# Patient Record
Sex: MALE | Race: WHITE | NOT HISPANIC OR LATINO | Employment: OTHER | URBAN - METROPOLITAN AREA
[De-identification: names, ages, dates, MRNs, and addresses within clinical notes are randomized per-mention and may not be internally consistent; named-entity substitution may affect disease eponyms.]

---

## 2017-12-12 ENCOUNTER — HOSPITAL ENCOUNTER (EMERGENCY)
Facility: CLINIC | Age: 72
Discharge: HOME | End: 2017-12-12
Attending: EMERGENCY MEDICINE | Admitting: EMERGENCY MEDICINE
Payer: MEDICARE

## 2017-12-12 VITALS
OXYGEN SATURATION: 97 % | SYSTOLIC BLOOD PRESSURE: 117 MMHG | TEMPERATURE: 97.5 F | DIASTOLIC BLOOD PRESSURE: 72 MMHG | RESPIRATION RATE: 16 BRPM

## 2017-12-12 DIAGNOSIS — I47.10 PAROXYSMAL SUPRAVENTRICULAR TACHYCARDIA (H): ICD-10-CM

## 2017-12-12 PROBLEM — C4A.30: Status: ACTIVE | Noted: 2017-12-12

## 2017-12-12 PROBLEM — E80.6 DISORDER OF BILIRUBIN EXCRETION: Status: ACTIVE | Noted: 2017-12-12

## 2017-12-12 PROBLEM — K20.90 ESOPHAGITIS: Status: ACTIVE | Noted: 2017-12-12

## 2017-12-12 PROCEDURE — 93005 ELECTROCARDIOGRAM TRACING: CPT | Mod: 76

## 2017-12-12 PROCEDURE — 25000128 H RX IP 250 OP 636

## 2017-12-12 PROCEDURE — 99291 CRITICAL CARE FIRST HOUR: CPT | Mod: 25 | Performed by: EMERGENCY MEDICINE

## 2017-12-12 PROCEDURE — 93010 ELECTROCARDIOGRAM REPORT: CPT | Mod: 76 | Performed by: EMERGENCY MEDICINE

## 2017-12-12 PROCEDURE — 96376 TX/PRO/DX INJ SAME DRUG ADON: CPT

## 2017-12-12 PROCEDURE — 96374 THER/PROPH/DIAG INJ IV PUSH: CPT

## 2017-12-12 PROCEDURE — 25000128 H RX IP 250 OP 636: Performed by: EMERGENCY MEDICINE

## 2017-12-12 PROCEDURE — 99285 EMERGENCY DEPT VISIT HI MDM: CPT | Mod: 25

## 2017-12-12 PROCEDURE — 93005 ELECTROCARDIOGRAM TRACING: CPT

## 2017-12-12 PROCEDURE — 93010 ELECTROCARDIOGRAM REPORT: CPT | Mod: Z6 | Performed by: EMERGENCY MEDICINE

## 2017-12-12 RX ORDER — ADENOSINE 3 MG/ML
6 INJECTION, SOLUTION INTRAVENOUS ONCE
Status: COMPLETED | OUTPATIENT
Start: 2017-12-12 | End: 2017-12-12

## 2017-12-12 RX ORDER — ADENOSINE 3 MG/ML
12 INJECTION, SOLUTION INTRAVENOUS ONCE
Status: COMPLETED | OUTPATIENT
Start: 2017-12-12 | End: 2017-12-12

## 2017-12-12 RX ORDER — ADENOSINE 3 MG/ML
INJECTION, SOLUTION INTRAVENOUS
Status: COMPLETED
Start: 2017-12-12 | End: 2017-12-12

## 2017-12-12 RX ADMIN — ADENOSINE 12 MG: 3 INJECTION, SOLUTION INTRAVENOUS at 03:20

## 2017-12-12 RX ADMIN — ADENOSINE 12 MG: 3 INJECTION, SOLUTION INTRAVENOUS at 03:15

## 2017-12-12 RX ADMIN — ADENOSINE 6 MG: 3 INJECTION, SOLUTION INTRAVENOUS at 03:13

## 2017-12-12 ASSESSMENT — ENCOUNTER SYMPTOMS
COUGH: 0
CHILLS: 0
APPETITE CHANGE: 0
HEADACHES: 0
FATIGUE: 0
WEAKNESS: 0
CHEST TIGHTNESS: 0
LIGHT-HEADEDNESS: 1
SHORTNESS OF BREATH: 0
PALPITATIONS: 1
VOMITING: 0
NAUSEA: 1
ABDOMINAL PAIN: 0
BACK PAIN: 0

## 2017-12-12 NOTE — ED PROVIDER NOTES
History     Chief Complaint   Patient presents with     Tachycardia     HPI  Kd Hernandez is a 72 year old male with history of PSVT who presents for evaluation of recurrent SVT tonight.  Patient states that he woke to use the bathroom around 2 AM and felt palpitations.  He reports associated mild chest discomfort and slight lightheadedness.  Denies fever chills.  Denies diaphoresis or nausea.  Symptoms similar to previous episodes of SVT.  Patient attempted vagal maneuvers at home without resolution of his symptoms.  Of note, patient did take a dose of Robitussin-DM tonight which may be the precipitating factor of his arrhythmia today.    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    Past Surgical History:    Past Surgical History:   Procedure Laterality Date     HERNIORRHAPHY UMBILICAL  12/6/2011    Procedure:HERNIORRHAPHY UMBILICAL; Umbilical Hernia Repair with Mesh; Surgeon:JESUS ALBERTO RODRÍGUEZ; Location:[facility]       Family History:    No family history on file.    Social History:  Marital Status:   [2]  Social History   Substance Use Topics     Smoking status: Not on file     Smokeless tobacco: Not on file     Alcohol use Not on file        Medications:      aspirin 81 MG tablet   atenolol (TENORMIN) 25 MG tablet   lorazepam (ATIVAN) 0.5 MG tablet   MetFORmin (GLUCOPHAGE) 500 MG tablet   alum hydroxide-mag carbonate (GAVISCON)   MG/15ML SUSP   simvastatin (ZOCOR) 10 MG tablet   tamsulosin (FLOMAX) 0.4 MG 24 hr capsule         Review of Systems   Constitutional: Negative for appetite change, chills and fatigue.   HENT: Negative for congestion.    Respiratory: Negative for cough, chest tightness and shortness of breath.    Cardiovascular: Positive for chest pain and palpitations. Negative for leg swelling.   Gastrointestinal: Positive for nausea. Negative for abdominal pain and vomiting.   Musculoskeletal: Negative for back pain.   Skin: Negative for rash.   Neurological: Positive for light-headedness. Negative for syncope, weakness and headaches.   All other systems reviewed and are negative.      Physical Exam   BP: 112/76  Heart Rate: 156  Temp: 97.5  F (36.4  C)  Resp: 18  SpO2: 100 %      Physical Exam   Constitutional: He is oriented to person, place, and time. He appears well-developed and well-nourished. No distress.   HENT:   Head: Normocephalic and atraumatic.   Eyes: Conjunctivae are normal.   Cardiovascular: Regular rhythm, normal heart sounds and intact distal pulses.  Tachycardia present.    No murmur heard.  Pulmonary/Chest: Effort normal and breath sounds normal. He has no rales.   Abdominal: Soft. There is no tenderness.   Neurological: He is alert and oriented to person, place, and time.   Skin: Skin is warm and dry. He is not diaphoretic.   Psychiatric: He has a normal mood and affect.   Nursing note and vitals reviewed.      ED Course     ED Course     Procedures               EKG Interpretation:      Interpreted by Matt Hung  Time reviewed: 0303  Symptoms at time of EKG: palpitations   Rhythm: paroxismal supraventricular tachycardia  Rate: 150-160  Axis: Normal  Ectopy: none  Conduction: normal  ST Segments/ T Waves: ST Segment Depression Global  Q Waves: none  Comparison to prior: Similar to previous SVT    Clinical Impression: supraventricular tachycardia with diffuse ST depression           EKG  Interpretation:      Interpreted by Matt Hung  Time reviewed:0325   Symptoms at time of EKG: none   Rhythm: Normal sinus   Rate: Normal  Axis: Normal  Ectopy: None  Conduction: Normal  ST Segments/ T Waves: Subtle residual lateral ST depression  Q Waves: None  Comparison to prior: Resolution of previous PSVT    Clinical Impression: Sinus rhythm with subtle ST depressions improved from previous              Critical Care Addendum    My initial assessment, based on my review of cardiac rhythm monitor and 12 lead ECG analysis, established that Kd Hernandez has a critical arrhythmia, which requires immediate intervention, and therefore He is critically ill.     After the initial assessment, the care team initiated medication therapy with adenosine to provide stabilization care. Due to the critical nature of this patient, I reassessed nursing observations, vital signs, physical exam, review of cardiac rhythm monitor and 12 lead ECG analysis multiple times prior to He disposition.     Time also spent performing documentation, discussion with family to obtain medical information for decision making and coordination of care.     Critical care time (excluding teaching time and procedures): 35 minutes.              Labs Ordered and Resulted from Time of ED Arrival Up to the Time of Departure from the ED - No data to display    4:24 AM: PT re-assessed. Feeling normal. No recurrent palpitations.  Patient remains in sinus rhythm in the 60s.  Plan to ambulate patient in the emergency department and if he continues to feel well, discharged with outpatient cardiology follow-up      Assessments & Plan (with Medical Decision Making)  72-year-old male with history of paroxysmal supraventricular tachycardia presented for evaluation of palpitations similar to his previous episodes of SVT.  On arrival, initial cardiac monitor shows SVT.  Temp today for maneuvers without success.  IV line established and patient given  adenosine.  3 doses required 6 mg, 12 mg, and a second 12 mg dose before conversion to sinus rhythm.  Patient had resolution of his symptoms.  Patient monitored in the emergency department for roughly an hour with no recurrent symptoms.  Patient remained asymptomatic and well appearing.  Patient seems stable at this time so was subsequently discharged home with a plan to follow-up with cardiology as an outpatient.     I have reviewed the nursing notes.    I have reviewed the findings, diagnosis, plan and need for follow up with the patient.       Discharge Medication List as of 12/12/2017  4:36 AM          Final diagnoses:   Paroxysmal supraventricular tachycardia (H)       12/12/2017   [facility] EMERGENCY DEPARTMENT     Hung, Matt Hines MD  12/14/17 0131

## 2017-12-12 NOTE — ED NOTES
See mere for meds given.     3 rounds of adenosine given IVP fast. successful on the 3rd attempt.     HR now NSR at a rate of 65 and PT states he is feeling much better.

## 2017-12-12 NOTE — DISCHARGE INSTRUCTIONS
Understanding Supraventricular Tachycardia  Supraventricular tachycardia (SVT) is a type of abnormal heart rhythm that results in fast heartbeats. The heart normally beats 60 to 100 beats per minute while you are at rest and awake. With SVT, the heart beats more than 100 times a minute. It may even beat over 200 times a minute. This is caused by a problem in the electrical system of the heart. It can lessen the amount of blood pumped through the heart.  How the heart beats  A heartbeat is the rhythm of the heart as it contracts to squeeze blood through the body. It s caused by electrical signals in the heart. A beat starts when a special group of cells give off an electrical signal. These cells are in the sinoatrial (SA) node. The SA node is in the upper right chamber of your heart (atrium). The signal from the SA node travels down to the 2 lower chambers of your heart (ventricles). On the way, the signal goes through the atrioventricular (AV) node. This is a special group of cells between the atria and ventricles. From there, the signal travels to your left and right ventricles. As it travels, the signal tells nearby parts of your heart to contract. This causes your heart to pump in a coordinated way.  What is SVT?  When you have SVT, the signal to start your heartbeat doesn t come from the SA node. Instead it comes from another part of the left or right atrium. Or it comes from the AV node. Some area outside the SA node begins to fire quickly, causing a rapid heartbeat of over 100 beats per minute or the electrical signals are caught up in an abnormal looping circuit. This shortens the time your ventricles have to fill. If your heartbeat is fast enough, your heart may be unable to pump enough blood forward to the rest of your body. The abnormal heartbeat may last for a few seconds to a few hours before your heart returns to its normal rhythm. Some SVT rhythms can last for days or weeks, or even become  permanent.  Types of SVT  There are several types of SVT. They include:    Atrial fibrillation. This is most common type of SVT. The upper chambers of the heart quiver very fast instead of pumping due to disorganized electrical activity in the atria.    Atrial flutter. This type of SVT is a milder form of fibrillation. The upper chambers of the heart flutter instead of pumping normally.    Atrioventricular gregoria reentrant tachycardia. It occurs when you have two channels through the AV node, instead of just one. The signal goes down one channel and up the other.    Atrioventricular reciprocating tachycardia. With this condition, there is an extra connection of muscle between the atrium and the ventricle. This is known as an accessory pathway and can conduct electricity upwards and downwards. The signal goes down the AV node and back to the atrium through the accessory pathway. It then goes down the AV node again. In rare cases, this condition leads to an abnormal heart rhythm that causes sudden death. This is a congenital defect, which means you were born with it.    Atrial tachycardia. This is another common type of SVT. A small group of cells in the atria begin to fire abnormally and trigger the fast heartbeat.    Multifocal atrial tachycardia. Multiple groups of cells in your atria fire abnormally and trigger a fast heartbeat.  What causes SVT?  Some types of SVT run in families. Some people have heart problems from birth that cause SVT. High blood pressure, heart failure, mitral valve disease, sleep apnea, thyroid problems, and heart attacks can cause SVT. Smoking, excess caffeine or alcohol, and some medicines can increase your risk of having SVT.  Symptoms of SVT  When SVT happens, you may feel no symptoms. Or you may have:    Fluttering feelings in your chest (palpitations)    A tight feeling or pain in your chest    A pulsing feeling in your neck    Dizziness    Shortness of  breath    Tiredness    Fainting    Nausea  In very rare cases, SVT can cause sudden death.  Diagnosing SVT  Your primary healthcare provider may diagnose you. Or you may see a heart doctor (cardiologist). The doctor will ask about your health history. He or she will also give you a physical exam. You may also have tests. These help show what kind of SVT you have, and what may cause it. They also help check for other problems. The tests may include:    Electrocardiogram (ECG), to analyze the abnormal rhythm    Continuous heart monitors such as a holter monitor or an event recorder to watch your heart rhythm over a longer period in an attempt to catch the SVT rhythm    Blood tests, to look for various causes such as thyroid problems or electrolyte abnormalities    Chest X-ray, to check for lung problems and look at the size of your heart    Exercise stress test, to see how well your heart works under stress    Echocardiography, to check your heart structure and function    Electrophysiologic study (EPS), an invasive procedure using wires in the heart to check the heart's electrical signals and diagnose the SVT  Date Last Reviewed: 5/1/2016 2000-2017 [employer]. [address] [place] [address]. All rights reserved. This information is not intended as a substitute for professional medical care. Always follow your healthcare professional's instructions.

## 2017-12-12 NOTE — ED NOTES
PT continues resting in position of comfort. All needs are being met and all comfort measures are being addressed. Awaiting MD dispo at this time. I will continue to assess and monitor PT.

## 2017-12-12 NOTE — ED AVS SNAPSHOT
[facility] Emergency Department    [address] [facility] [address]    Phone:  [phone]    Fax:  [phone]                                       Kd Hernandez   MRN: [id number]    Department:  [facility] Emergency Department   Date of Visit:  12/12/2017           After Visit Summary Signature Page     I have received my discharge instructions, and my questions have been answered. I have discussed any challenges I see with this plan with the nurse or doctor.    ..........................................................................................................................................  Patient/Patient Representative Signature      ..........................................................................................................................................  Patient Representative Print Name and Relationship to Patient    ..................................................               ................................................  Date                                            Time    ..........................................................................................................................................  Reviewed by Signature/Title    ...................................................              ..............................................  Date                                                            Time

## 2017-12-12 NOTE — ED NOTES
PT OOB and ambulating department with steady gait and no assist. Denies SOB, nausea, dizziness or feeling like his heart is racing. Placed back on josiah and updated MD.

## 2017-12-12 NOTE — ED AVS SNAPSHOT
[facility] Emergency Department    [address] [facility] [address]    Phone:  [phone]    Fax:  [phone]                                       Kd Hernandez   MRN: [id number]    Department:  [facility] Emergency Department   Date of Visit:  12/12/2017           Patient Information     Date Of Birth          1945        Your diagnoses for this visit were:     Paroxysmal supraventricular tachycardia (H)        You were seen by Matt Hung MD.      Follow-up Information     Schedule an appointment as soon as possible for a visit with Matt Zamudio MD.    Specialty:  Family Practice    Contact information:    [facility]  [address] [facility] [id number] [phone]          Schedule an appointment as soon as possible for a visit with [facility].    Specialty:  Cardiology    Why:  For follow up of your abnormal heart rhythm    Contact information:    [address] [place] [address] [phone]    Additional information:    The medical center is located at   [address] [place] [address] ([facility] [address]   [facility] [place], [place] miles north   of [place]).        Discharge Instructions         Understanding Supraventricular Tachycardia  Supraventricular tachycardia (SVT) is a type of abnormal heart rhythm that results in fast heartbeats. The heart normally beats 60 to 100 beats per minute while you are at rest and awake. With SVT, the heart beats more than 100 times a minute. It may even beat over 200 times a minute. This is caused by a problem in the electrical system of the heart. It can lessen the amount of blood pumped through the heart.  How the heart beats  A heartbeat is the rhythm of the heart as it contracts to squeeze blood through the body. It s caused by electrical signals in the heart. A beat starts when a special group of cells give off an electrical signal. These cells are in the sinoatrial  (SA) node. The SA node is in the upper right chamber of your heart (atrium). The signal from the SA node travels down to the 2 lower chambers of your heart (ventricles). On the way, the signal goes through the atrioventricular (AV) node. This is a special group of cells between the atria and ventricles. From there, the signal travels to your left and right ventricles. As it travels, the signal tells nearby parts of your heart to contract. This causes your heart to pump in a coordinated way.  What is SVT?  When you have SVT, the signal to start your heartbeat doesn t come from the SA node. Instead it comes from another part of the left or right atrium. Or it comes from the AV node. Some area outside the SA node begins to fire quickly, causing a rapid heartbeat of over 100 beats per minute or the electrical signals are caught up in an abnormal looping circuit. This shortens the time your ventricles have to fill. If your heartbeat is fast enough, your heart may be unable to pump enough blood forward to the rest of your body. The abnormal heartbeat may last for a few seconds to a few hours before your heart returns to its normal rhythm. Some SVT rhythms can last for days or weeks, or even become permanent.  Types of SVT  There are several types of SVT. They include:    Atrial fibrillation. This is most common type of SVT. The upper chambers of the heart quiver very fast instead of pumping due to disorganized electrical activity in the atria.    Atrial flutter. This type of SVT is a milder form of fibrillation. The upper chambers of the heart flutter instead of pumping normally.    Atrioventricular gregoria reentrant tachycardia. It occurs when you have two channels through the AV node, instead of just one. The signal goes down one channel and up the other.    Atrioventricular reciprocating tachycardia. With this condition, there is an extra connection of muscle between the atrium and the ventricle. This is known as an  accessory pathway and can conduct electricity upwards and downwards. The signal goes down the AV node and back to the atrium through the accessory pathway. It then goes down the AV node again. In rare cases, this condition leads to an abnormal heart rhythm that causes sudden death. This is a congenital defect, which means you were born with it.    Atrial tachycardia. This is another common type of SVT. A small group of cells in the atria begin to fire abnormally and trigger the fast heartbeat.    Multifocal atrial tachycardia. Multiple groups of cells in your atria fire abnormally and trigger a fast heartbeat.  What causes SVT?  Some types of SVT run in families. Some people have heart problems from birth that cause SVT. High blood pressure, heart failure, mitral valve disease, sleep apnea, thyroid problems, and heart attacks can cause SVT. Smoking, excess caffeine or alcohol, and some medicines can increase your risk of having SVT.  Symptoms of SVT  When SVT happens, you may feel no symptoms. Or you may have:    Fluttering feelings in your chest (palpitations)    A tight feeling or pain in your chest    A pulsing feeling in your neck    Dizziness    Shortness of breath    Tiredness    Fainting    Nausea  In very rare cases, SVT can cause sudden death.  Diagnosing SVT  Your primary healthcare provider may diagnose you. Or you may see a heart doctor (cardiologist). The doctor will ask about your health history. He or she will also give you a physical exam. You may also have tests. These help show what kind of SVT you have, and what may cause it. They also help check for other problems. The tests may include:    Electrocardiogram (ECG), to analyze the abnormal rhythm    Continuous heart monitors such as a holter monitor or an event recorder to watch your heart rhythm over a longer period in an attempt to catch the SVT rhythm    Blood tests, to look for various causes such as thyroid problems or electrolyte  abnormalities    Chest X-ray, to check for lung problems and look at the size of your heart    Exercise stress test, to see how well your heart works under stress    Echocardiography, to check your heart structure and function    Electrophysiologic study (EPS), an invasive procedure using wires in the heart to check the heart's electrical signals and diagnose the SVT  Date Last Reviewed: 5/1/2016 2000-2017 [employer]. [address] [place] [address], [place] [address]. All rights reserved. This information is not intended as a substitute for professional medical care. Always follow your healthcare professional's instructions.          24 Hour Appointment Hotline       To make an appointment at any [facility], call [phone] ([phone]). If you don't have a family doctor or clinic, we will help you find one. [facility] clinics are conveniently located to serve the needs of you and your family.             Review of your medicines      Our records show that you are taking the medicines listed below. If these are incorrect, please call your family doctor or clinic.        Dose / Directions Last dose taken    aspirin 81 MG tablet   Dose:  1 tablet        Take 1 tablet by mouth daily.   Refills:  0        atenolol 25 MG tablet   Commonly known as:  TENORMIN        Take  by mouth daily. Pt takes 12.5mg daily (he cuts pill in half)   Refills:  0        FLOMAX 0.4 MG capsule   Dose:  0.4 mg   Generic drug:  tamsulosin        Take 0.4 mg by mouth daily.   Refills:  0        GAVISCON  MG/15ML Susp suspension   Dose:  30 mL   Generic drug:  alum hydroxide-mag carbonate        Take 30 mLs by mouth 2 times daily as needed.   Refills:  0        LORazepam 0.5 MG tablet   Commonly known as:  ATIVAN   Dose:  0.5 mg        Take 0.5 mg by mouth every 6 hours as needed.   Refills:  0        metFORMIN 500 MG tablet   Commonly known as:  GLUCOPHAGE   Dose:  500 mg        Take 500 mg by mouth 2 times daily  "(with meals).   Refills:  0        simvastatin 10 MG tablet   Commonly known as:  ZOCOR   Dose:  10 mg        Take 10 mg by mouth At Bedtime.   Refills:  0                Procedures and tests performed during your visit     Procedure/Test Number of Times Performed    EKG 12 lead 2      Orders Needing Specimen Collection     None      Pending Results     No orders found from 12/10/2017 to 2017.            Pending Culture Results     No orders found from 12/10/2017 to 2017.            Pending Results Instructions     If you had any lab results that were not finalized at the time of your Discharge, you can call the ED Lab Result RN at [phone]. You will be contacted by this team for any positive Lab results or changes in treatment. The nurses are available 7 days a week from 10A to 6:30P.  You can leave a message 24 hours per day and they will return your call.        Test Results From Your Hospital Stay               Thank you for choosing [facility]       Thank you for choosing [facility] for your care. Our goal is always to provide you with excellent care. Hearing back from our patients is one way we can continue to improve our services. Please take a few minutes to complete the written survey that you may receive in the mail after you visit with us. Thank you!        [employer] Information     [facility] lets you send messages to your doctor, view your test results, renew your prescriptions, schedule appointments and more. To sign up, go to [email or web address] . Click on \"Log in\" on the left side of the screen, which will take you to the Welcome page. Then click on \"Sign up Now\" on the right side of the page.     You will be asked to enter the access code listed below, as well as some personal information. Please follow the directions to create your username and password.     Your access code is: [email or web address]  Expires: 3/12/2018  4:13 AM     Your access code will  in 90 days. If you need help or a " new code, please call your [facility] clinic or [phone].        Care EveryWhere ID     This is your Care EveryWhere ID. This could be used by other organizations to access your [facility] medical records  [id number]        Equal Access to Services     ANGELICA WADDELL : Audra wong Soaidee, waaxda luqadaha, qaybta kaalmada mac, juany kline. So [facility] [phone].    ATENCIÓN: Si habla español, tiene a kuo disposición servicios gratuitos de asistencia lingüística. Llame al [phone].    We comply with applicable federal civil rights laws and [place] laws. We do not discriminate on the basis of race, color, national origin, age, disability, sex, sexual orientation, or gender identity.            After Visit Summary       This is your record. Keep this with you and show to your community pharmacist(s) and doctor(s) at your next visit.

## 2017-12-12 NOTE — ED NOTES
PT presents to the ED with C/O fast heart rate.   Brought to room 3 VIA wheelchair, placed lilian gown, omn the gurney and on  The cardiac monitor. PT is noted to be in SVT at a rate of 160s. PT states he has mild SOB and denies pain anywhere. PT is A&OX4, appears to be in mild discomfort. All needs are being assessed and will be met and all comfort measures are being addressed. Awaiting MD gill and orders at this time.

## 2018-05-22 ENCOUNTER — HOSPITAL ENCOUNTER (EMERGENCY)
Facility: CLINIC | Age: 73
Discharge: HOME | End: 2018-05-22
Attending: EMERGENCY MEDICINE | Admitting: EMERGENCY MEDICINE
Payer: MEDICARE

## 2018-05-22 VITALS
DIASTOLIC BLOOD PRESSURE: 104 MMHG | BODY MASS INDEX: 24.91 KG/M2 | OXYGEN SATURATION: 97 % | TEMPERATURE: 97.9 F | HEIGHT: 70 IN | SYSTOLIC BLOOD PRESSURE: 141 MMHG | RESPIRATION RATE: 18 BRPM | WEIGHT: 174 LBS

## 2018-05-22 DIAGNOSIS — I47.10 PAROXYSMAL SUPRAVENTRICULAR TACHYCARDIA (H): ICD-10-CM

## 2018-05-22 LAB
ANION GAP SERPL CALCULATED.3IONS-SCNC: 6 MMOL/L (ref 3–14)
APTT PPP: 28 SEC (ref 22–37)
BASOPHILS # BLD AUTO: 0 10E9/L (ref 0–0.2)
BASOPHILS NFR BLD AUTO: 0.4 %
BUN SERPL-MCNC: 16 MG/DL (ref 7–30)
CALCIUM SERPL-MCNC: 8.6 MG/DL (ref 8.5–10.1)
CHLORIDE SERPL-SCNC: 108 MMOL/L (ref 94–109)
CO2 SERPL-SCNC: 28 MMOL/L (ref 20–32)
CREAT SERPL-MCNC: 1.01 MG/DL (ref 0.66–1.25)
DIFFERENTIAL METHOD BLD: NORMAL
EOSINOPHIL # BLD AUTO: 0.2 10E9/L (ref 0–0.7)
EOSINOPHIL NFR BLD AUTO: 2.7 %
ERYTHROCYTE [DISTWIDTH] IN BLOOD BY AUTOMATED COUNT: 13.3 % (ref 10–15)
GFR SERPL CREATININE-BSD FRML MDRD: 72 ML/MIN/1.7M2
GLUCOSE SERPL-MCNC: 219 MG/DL (ref 70–99)
HCT VFR BLD AUTO: 45.4 % (ref 40–53)
HGB BLD-MCNC: 15.6 G/DL (ref 13.3–17.7)
IMM GRANULOCYTES # BLD: 0 10E9/L (ref 0–0.4)
IMM GRANULOCYTES NFR BLD: 0.4 %
INR PPP: 1.09 (ref 0.86–1.14)
LYMPHOCYTES # BLD AUTO: 1.1 10E9/L (ref 0.8–5.3)
LYMPHOCYTES NFR BLD AUTO: 16.2 %
MCH RBC QN AUTO: 30.9 PG (ref 26.5–33)
MCHC RBC AUTO-ENTMCNC: 34.4 G/DL (ref 31.5–36.5)
MCV RBC AUTO: 90 FL (ref 78–100)
MONOCYTES # BLD AUTO: 0.6 10E9/L (ref 0–1.3)
MONOCYTES NFR BLD AUTO: 7.9 %
NEUTROPHILS # BLD AUTO: 5.1 10E9/L (ref 1.6–8.3)
NEUTROPHILS NFR BLD AUTO: 72.4 %
PLATELET # BLD AUTO: 170 10E9/L (ref 150–450)
POTASSIUM SERPL-SCNC: 4.1 MMOL/L (ref 3.4–5.3)
RBC # BLD AUTO: 5.05 10E12/L (ref 4.4–5.9)
SODIUM SERPL-SCNC: 142 MMOL/L (ref 133–144)
TROPONIN I SERPL-MCNC: <0.015 UG/L (ref 0–0.04)
WBC # BLD AUTO: 7.1 10E9/L (ref 4–11)

## 2018-05-22 PROCEDURE — 84484 ASSAY OF TROPONIN QUANT: CPT | Performed by: EMERGENCY MEDICINE

## 2018-05-22 PROCEDURE — 80048 BASIC METABOLIC PNL TOTAL CA: CPT | Performed by: EMERGENCY MEDICINE

## 2018-05-22 PROCEDURE — 25000128 H RX IP 250 OP 636: Performed by: EMERGENCY MEDICINE

## 2018-05-22 PROCEDURE — 85730 THROMBOPLASTIN TIME PARTIAL: CPT | Performed by: EMERGENCY MEDICINE

## 2018-05-22 PROCEDURE — 85610 PROTHROMBIN TIME: CPT | Performed by: EMERGENCY MEDICINE

## 2018-05-22 PROCEDURE — 96361 HYDRATE IV INFUSION ADD-ON: CPT | Performed by: EMERGENCY MEDICINE

## 2018-05-22 PROCEDURE — 99284 EMERGENCY DEPT VISIT MOD MDM: CPT | Mod: 25 | Performed by: EMERGENCY MEDICINE

## 2018-05-22 PROCEDURE — 25000128 H RX IP 250 OP 636

## 2018-05-22 PROCEDURE — 93005 ELECTROCARDIOGRAM TRACING: CPT | Performed by: EMERGENCY MEDICINE

## 2018-05-22 PROCEDURE — 99291 CRITICAL CARE FIRST HOUR: CPT | Mod: 25 | Performed by: EMERGENCY MEDICINE

## 2018-05-22 PROCEDURE — 93010 ELECTROCARDIOGRAM REPORT: CPT | Mod: 76 | Performed by: EMERGENCY MEDICINE

## 2018-05-22 PROCEDURE — 96374 THER/PROPH/DIAG INJ IV PUSH: CPT | Performed by: EMERGENCY MEDICINE

## 2018-05-22 PROCEDURE — 93005 ELECTROCARDIOGRAM TRACING: CPT | Mod: 76 | Performed by: EMERGENCY MEDICINE

## 2018-05-22 PROCEDURE — 93010 ELECTROCARDIOGRAM REPORT: CPT | Mod: Z6 | Performed by: EMERGENCY MEDICINE

## 2018-05-22 PROCEDURE — 85025 COMPLETE CBC W/AUTO DIFF WBC: CPT | Performed by: EMERGENCY MEDICINE

## 2018-05-22 RX ORDER — ADENOSINE 3 MG/ML
6 INJECTION, SOLUTION INTRAVENOUS ONCE
Status: COMPLETED | OUTPATIENT
Start: 2018-05-22 | End: 2018-05-22

## 2018-05-22 RX ORDER — ADENOSINE 3 MG/ML
INJECTION, SOLUTION INTRAVENOUS
Status: COMPLETED
Start: 2018-05-22 | End: 2018-05-22

## 2018-05-22 RX ORDER — ADENOSINE 3 MG/ML
12 INJECTION, SOLUTION INTRAVENOUS ONCE
Status: COMPLETED | OUTPATIENT
Start: 2018-05-22 | End: 2018-05-22

## 2018-05-22 RX ADMIN — SODIUM CHLORIDE 500 ML: 9 INJECTION, SOLUTION INTRAVENOUS at 09:00

## 2018-05-22 RX ADMIN — ADENOSINE 6 MG: 3 INJECTION, SOLUTION INTRAVENOUS at 09:05

## 2018-05-22 RX ADMIN — ADENOSINE 12 MG: 3 INJECTION, SOLUTION INTRAVENOUS at 09:11

## 2018-05-22 NOTE — ED NOTES
Pads placed on patient with defibrillator at bedside. Pt on monitor. MD at bedside, adenosine 6 mg given IV. No response from medication.

## 2018-05-22 NOTE — ED AVS SNAPSHOT
[facility] Emergency Department    [address] [facility] [address]    Phone:  [phone]    Fax:  [phone]                                       Kd Hernandez   MRN: [id number]    Department:  [facility] Emergency Department   Date of Visit:  5/22/2018           After Visit Summary Signature Page     I have received my discharge instructions, and my questions have been answered. I have discussed any challenges I see with this plan with the nurse or doctor.    ..........................................................................................................................................  Patient/Patient Representative Signature      ..........................................................................................................................................  Patient Representative Print Name and Relationship to Patient    ..................................................               ................................................  Date                                            Time    ..........................................................................................................................................  Reviewed by Signature/Title    ...................................................              ..............................................  Date                                                            Time

## 2018-05-22 NOTE — DISCHARGE INSTRUCTIONS
Return if symptoms worsen or new symptoms develop.  Follow-up with primary care physician next available.  Drink plenty of fluids.  Follow-up with cardiology as soon as possible to discuss your SVT.  If increased heart rate any chest pain shortness of breath or other symptoms present please return for recheck  Treatment for Supraventricular Tachycardia  Supraventricular tachycardia (SVT) is a type of abnormally fast heartbeat. The heart normally beats 60 to 100 beats per minute. With SVT, the heart beats more than 100 times a minute. It may beat as fast as 250 times a minute. This is caused by a problem in the electrical system of the heart. It can lessen the amount of blood pumped through the heart.  Types of treatment  You may not need treatment for SVT if you have only had one episode. If you do need treatment, there are several kinds. They include:    Valsalva maneuver. This is a way to increase pressure in the abdomen and chest. It can correct your heart rhythm right away. To do it, you bear down with your stomach muscles, as though you are trying to have a bowel movement.    Carotid massage. Your healthcare provider may gently rub the carotid artery in your neck. This can also help correct the heart rhythm right away.    Medicine. There are various kinds you can take. Calcium channel blockers or adenosine can help correct heart rhythm right away. If you have SVT only 1 or 2 times a year, you may take beta-blockers or calcium channel medicine as needed. If your SVT is more frequent, you may need to take medicine every day. Some people may need to take several medicines to prevent episodes of SVT.    Electrocardioversion. This is a shock to the heart to restart a normal rhythm right away. This may be done if you have a severe episode of SVT.    Catheter ablation. This can help permanently stop SVT. Your healthcare provider puts a thin, flexible tube (catheter) into a blood vessel in the groin. He or she then  gently pushes it up into your heart. The area of your heart that causes your SVT is then burned. This prevents that area from starting a signal that causes SVT.   Lifestyle changes to help prevent SVT episodes  Your healthcare provider might suggest other ways to help prevent SVT, such as:    Having less alcohol and caffeine    Not smoking    Lowering your stress    Eating foods that are healthy for your heart  When to call your healthcare provider  Call your healthcare provider if you have any of the following:    Sudden shortness of breath (call 911)    Severe palpitations    Severe dizziness    Severe chest pain    Symptoms that are happening more often   Date Last Reviewed: 8/10/2015    0498-9263 [employer]. [address] [place] [address]. All rights reserved. This information is not intended as a substitute for professional medical care. Always follow your healthcare professional's instructions.          Understanding Supraventricular Tachycardia  Supraventricular tachycardia (SVT) is a type of abnormal heart rhythm that results in fast heartbeats. The heart normally beats 60 to 100 beats per minute while you are at rest and awake. With SVT, the heart beats more than 100 times a minute. It may even beat over 200 times a minute. This is caused by a problem in the electrical system of the heart. It can lessen the amount of blood pumped through the heart.  How the heart beats  A heartbeat is the rhythm of the heart as it contracts to squeeze blood through the body. It s caused by electrical signals in the heart. A beat starts when a special group of cells give off an electrical signal. These cells are in the sinoatrial (SA) node. The SA node is in the upper right chamber of your heart (atrium). The signal from the SA node travels down to the 2 lower chambers of your heart (ventricles). On the way, the signal goes through the atrioventricular (AV) node. This is a special group of cells  between the atria and ventricles. From there, the signal travels to your left and right ventricles. As it travels, the signal tells nearby parts of your heart to contract. This causes your heart to pump in a coordinated way.  What is SVT?  When you have SVT, the signal to start your heartbeat doesn t come from the SA node. Instead it comes from another part of the left or right atrium. Or it comes from the AV node. Some area outside the SA node begins to fire quickly, causing a rapid heartbeat of over 100 beats per minute or the electrical signals are caught up in an abnormal looping circuit. This shortens the time your ventricles have to fill. If your heartbeat is fast enough, your heart may be unable to pump enough blood forward to the rest of your body. The abnormal heartbeat may last for a few seconds to a few hours before your heart returns to its normal rhythm. Some SVT rhythms can last for days or weeks, or even become permanent.  Types of SVT  There are several types of SVT. They include:    Atrial fibrillation. This is most common type of SVT. The upper chambers of the heart quiver very fast instead of pumping due to disorganized electrical activity in the atria.    Atrial flutter. This type of SVT is a milder form of fibrillation. The upper chambers of the heart flutter instead of pumping normally.    Atrioventricular gregoria reentrant tachycardia. It occurs when you have two channels through the AV node, instead of just one. The signal goes down one channel and up the other.    Atrioventricular reciprocating tachycardia. With this condition, there is an extra connection of muscle between the atrium and the ventricle. This is known as an accessory pathway and can conduct electricity upwards and downwards. The signal goes down the AV node and back to the atrium through the accessory pathway. It then goes down the AV node again. In rare cases, this condition leads to an abnormal heart rhythm that causes sudden  death. This is a congenital defect, which means you were born with it.    Atrial tachycardia. This is another common type of SVT. A small group of cells in the atria begin to fire abnormally and trigger the fast heartbeat.    Multifocal atrial tachycardia. Multiple groups of cells in your atria fire abnormally and trigger a fast heartbeat.  What causes SVT?  Some types of SVT run in families. Some people have heart problems from birth that cause SVT. High blood pressure, heart failure, mitral valve disease, sleep apnea, thyroid problems, and heart attacks can cause SVT. Smoking, excess caffeine or alcohol, and some medicines can increase your risk of having SVT.  Symptoms of SVT  When SVT happens, you may feel no symptoms. Or you may have:    Fluttering feelings in your chest (palpitations)    A tight feeling or pain in your chest    A pulsing feeling in your neck    Dizziness    Shortness of breath    Tiredness    Fainting    Nausea  In very rare cases, SVT can cause sudden death.  Diagnosing SVT  Your primary healthcare provider may diagnose you. Or you may see a heart doctor (cardiologist). The doctor will ask about your health history. He or she will also give you a physical exam. You may also have tests. These help show what kind of SVT you have, and what may cause it. They also help check for other problems. The tests may include:    Electrocardiogram (ECG), to analyze the abnormal rhythm    Continuous heart monitors such as a holter monitor or an event recorder to watch your heart rhythm over a longer period in an attempt to catch the SVT rhythm    Blood tests, to look for various causes such as thyroid problems or electrolyte abnormalities    Chest X-ray, to check for lung problems and look at the size of your heart    Exercise stress test, to see how well your heart works under stress    Echocardiography, to check your heart structure and function    Electrophysiologic study (EPS), an invasive procedure  using wires in the heart to check the heart's electrical signals and diagnose the SVT  Date Last Reviewed: 5/1/2016 2000-2017 [employer]. [address] [place] [address], [place] [address]. All rights reserved. This information is not intended as a substitute for professional medical care. Always follow your healthcare professional's instructions.          Valsalva Maneuver  Valsalva maneuver is used to slow the heart when it is beating in a supraventricular tachycardia (SVT). This set of physical maneuvers can trigger a relaxation reflex in the heart's electrical system. This slows down the heart rate and the speed at which the heart's electrical signals travel down specialized wires. If an SVT is using some of the heart's normal electrical pathways, these maneuvers may slow or stop the abnormal heart rhythm right away.   If you have another episode of SVT that does not stop within a few minutes of lying down, you may try the Valsalva maneuver:    Sit or lie down.    Take a deep breath and hold it.    Now bear down hard with your stomach muscles, as if you were trying to have a bowel movement.    Strain hard and hold the strain for 10 to 15 seconds. The harder you strain, the more likely the maneuver may work.    If this doesn t stop your symptoms, wait for at least 1 minute and try a second time.  What to do next  If this procedure does stop your episode of SVT, call your healthcare provider and let him or her know what happened.  If this procedure does not stop your SVT and the palpitations continue for more than 20 minutes, you may need to go to the emergency department for treatment. Also go right away if you have any of these symptoms along with the palpitations:    Pain in your chest, shoulder, arm, neck, or upper back    Shortness of breath    Weakness    Fainting or lightheadedness  Don't go to your healthcare provider s office or to a clinic instead of the ER. These places will not be able to give  you all the testing and treatment needed for this condition.  If symptoms are mild, don t drive yourself to the ER. Have someone else drive you.  Call 911  Call 911 if your symptoms are severe. This is the fastest and safest way to get to the ER, because paramedics can start treatment on the way to the hospital.   Date Last Reviewed: 5/1/2016 2000-2017 [employer]. [address] [place] [address], [place] [address]. All rights reserved. This information is not intended as a substitute for professional medical care. Always follow your healthcare professional's instructions.

## 2018-05-22 NOTE — ED NOTES
Pt given adenosine 12mg IV per MD order. MD at bedside for med administration. Pt now in NSR HR 61. Pt tolerated well.

## 2018-05-22 NOTE — ED AVS SNAPSHOT
[facility]    [address] [facility] [address]    Phone:  [phone]    Fax:  [phone]                                       Kd Hernandez   MRN: [id number]    Department:  [facility]   Date of Visit:  5/22/2018           Patient Information     Date Of Birth          1945        Your diagnoses for this visit were:     Paroxysmal supraventricular tachycardia (H)        You were seen by Sanchez Mae MD.      Follow-up Information     Follow up with Semmler, Steven Duane, MD.    Specialty:  Family Practice    Why:  Later this week for recheck    Contact information:    [facility]  [address] [facility] [id number] [phone]          Follow up with [facility].    Specialty:  EMERGENCY MEDICINE    Why:  If symptoms worsen    Contact information:    [address] [place] [id number] [phone]    Additional information:    The medical center is located at   [address] [facility]. (between [address] and   [address] in [place], four miles north   of [place]).        Discharge Instructions       Return if symptoms worsen or new symptoms develop.  Follow-up with primary care physician next available.  Drink plenty of fluids.  Follow-up with cardiology as soon as possible to discuss your SVT.  If increased heart rate any chest pain shortness of breath or other symptoms present please return for recheck  Treatment for Supraventricular Tachycardia  Supraventricular tachycardia (SVT) is a type of abnormally fast heartbeat. The heart normally beats 60 to 100 beats per minute. With SVT, the heart beats more than 100 times a minute. It may beat as fast as 250 times a minute. This is caused by a problem in the electrical system of the heart. It can lessen the amount of blood pumped through the heart.  Types of treatment  You may not need treatment for SVT if you have only had one  episode. If you do need treatment, there are several kinds. They include:    Valsalva maneuver. This is a way to increase pressure in the abdomen and chest. It can correct your heart rhythm right away. To do it, you bear down with your stomach muscles, as though you are trying to have a bowel movement.    Carotid massage. Your healthcare provider may gently rub the carotid artery in your neck. This can also help correct the heart rhythm right away.    Medicine. There are various kinds you can take. Calcium channel blockers or adenosine can help correct heart rhythm right away. If you have SVT only 1 or 2 times a year, you may take beta-blockers or calcium channel medicine as needed. If your SVT is more frequent, you may need to take medicine every day. Some people may need to take several medicines to prevent episodes of SVT.    Electrocardioversion. This is a shock to the heart to restart a normal rhythm right away. This may be done if you have a severe episode of SVT.    Catheter ablation. This can help permanently stop SVT. Your healthcare provider puts a thin, flexible tube (catheter) into a blood vessel in the groin. He or she then gently pushes it up into your heart. The area of your heart that causes your SVT is then burned. This prevents that area from starting a signal that causes SVT.   Lifestyle changes to help prevent SVT episodes  Your healthcare provider might suggest other ways to help prevent SVT, such as:    Having less alcohol and caffeine    Not smoking    Lowering your stress    Eating foods that are healthy for your heart  When to call your healthcare provider  Call your healthcare provider if you have any of the following:    Sudden shortness of breath (call 911)    Severe palpitations    Severe dizziness    Severe chest pain    Symptoms that are happening more often   Date Last Reviewed: 8/10/2015    [id number] [employer]. [address] [place] [address], [place] [address]. All rights  reserved. This information is not intended as a substitute for professional medical care. Always follow your healthcare professional's instructions.          Understanding Supraventricular Tachycardia  Supraventricular tachycardia (SVT) is a type of abnormal heart rhythm that results in fast heartbeats. The heart normally beats 60 to 100 beats per minute while you are at rest and awake. With SVT, the heart beats more than 100 times a minute. It may even beat over 200 times a minute. This is caused by a problem in the electrical system of the heart. It can lessen the amount of blood pumped through the heart.  How the heart beats  A heartbeat is the rhythm of the heart as it contracts to squeeze blood through the body. It s caused by electrical signals in the heart. A beat starts when a special group of cells give off an electrical signal. These cells are in the sinoatrial (SA) node. The SA node is in the upper right chamber of your heart (atrium). The signal from the SA node travels down to the 2 lower chambers of your heart (ventricles). On the way, the signal goes through the atrioventricular (AV) node. This is a special group of cells between the atria and ventricles. From there, the signal travels to your left and right ventricles. As it travels, the signal tells nearby parts of your heart to contract. This causes your heart to pump in a coordinated way.  What is SVT?  When you have SVT, the signal to start your heartbeat doesn t come from the SA node. Instead it comes from another part of the left or right atrium. Or it comes from the AV node. Some area outside the SA node begins to fire quickly, causing a rapid heartbeat of over 100 beats per minute or the electrical signals are caught up in an abnormal looping circuit. This shortens the time your ventricles have to fill. If your heartbeat is fast enough, your heart may be unable to pump enough blood forward to the rest of your body. The abnormal heartbeat may  last for a few seconds to a few hours before your heart returns to its normal rhythm. Some SVT rhythms can last for days or weeks, or even become permanent.  Types of SVT  There are several types of SVT. They include:    Atrial fibrillation. This is most common type of SVT. The upper chambers of the heart quiver very fast instead of pumping due to disorganized electrical activity in the atria.    Atrial flutter. This type of SVT is a milder form of fibrillation. The upper chambers of the heart flutter instead of pumping normally.    Atrioventricular gregoria reentrant tachycardia. It occurs when you have two channels through the AV node, instead of just one. The signal goes down one channel and up the other.    Atrioventricular reciprocating tachycardia. With this condition, there is an extra connection of muscle between the atrium and the ventricle. This is known as an accessory pathway and can conduct electricity upwards and downwards. The signal goes down the AV node and back to the atrium through the accessory pathway. It then goes down the AV node again. In rare cases, this condition leads to an abnormal heart rhythm that causes sudden death. This is a congenital defect, which means you were born with it.    Atrial tachycardia. This is another common type of SVT. A small group of cells in the atria begin to fire abnormally and trigger the fast heartbeat.    Multifocal atrial tachycardia. Multiple groups of cells in your atria fire abnormally and trigger a fast heartbeat.  What causes SVT?  Some types of SVT run in families. Some people have heart problems from birth that cause SVT. High blood pressure, heart failure, mitral valve disease, sleep apnea, thyroid problems, and heart attacks can cause SVT. Smoking, excess caffeine or alcohol, and some medicines can increase your risk of having SVT.  Symptoms of SVT  When SVT happens, you may feel no symptoms. Or you may have:    Fluttering feelings in your chest  (palpitations)    A tight feeling or pain in your chest    A pulsing feeling in your neck    Dizziness    Shortness of breath    Tiredness    Fainting    Nausea  In very rare cases, SVT can cause sudden death.  Diagnosing SVT  Your primary healthcare provider may diagnose you. Or you may see a heart doctor (cardiologist). The doctor will ask about your health history. He or she will also give you a physical exam. You may also have tests. These help show what kind of SVT you have, and what may cause it. They also help check for other problems. The tests may include:    Electrocardiogram (ECG), to analyze the abnormal rhythm    Continuous heart monitors such as a holter monitor or an event recorder to watch your heart rhythm over a longer period in an attempt to catch the SVT rhythm    Blood tests, to look for various causes such as thyroid problems or electrolyte abnormalities    Chest X-ray, to check for lung problems and look at the size of your heart    Exercise stress test, to see how well your heart works under stress    Echocardiography, to check your heart structure and function    Electrophysiologic study (EPS), an invasive procedure using wires in the heart to check the heart's electrical signals and diagnose the SVT  Date Last Reviewed: 5/1/2016 2000-2017 [employer]. [address] [place] [address]. All rights reserved. This information is not intended as a substitute for professional medical care. Always follow your healthcare professional's instructions.          Valsalva Maneuver  Valsalva maneuver is used to slow the heart when it is beating in a supraventricular tachycardia (SVT). This set of physical maneuvers can trigger a relaxation reflex in the heart's electrical system. This slows down the heart rate and the speed at which the heart's electrical signals travel down specialized wires. If an SVT is using some of the heart's normal electrical pathways, these maneuvers may  slow or stop the abnormal heart rhythm right away.   If you have another episode of SVT that does not stop within a few minutes of lying down, you may try the Valsalva maneuver:    Sit or lie down.    Take a deep breath and hold it.    Now bear down hard with your stomach muscles, as if you were trying to have a bowel movement.    Strain hard and hold the strain for 10 to 15 seconds. The harder you strain, the more likely the maneuver may work.    If this doesn t stop your symptoms, wait for at least 1 minute and try a second time.  What to do next  If this procedure does stop your episode of SVT, call your healthcare provider and let him or her know what happened.  If this procedure does not stop your SVT and the palpitations continue for more than 20 minutes, you may need to go to the emergency department for treatment. Also go right away if you have any of these symptoms along with the palpitations:    Pain in your chest, shoulder, arm, neck, or upper back    Shortness of breath    Weakness    Fainting or lightheadedness  Don't go to your healthcare provider s office or to a clinic instead of the ER. These places will not be able to give you all the testing and treatment needed for this condition.  If symptoms are mild, don t drive yourself to the ER. Have someone else drive you.  Call 911  Call 911 if your symptoms are severe. This is the fastest and safest way to get to the ER, because paramedics can start treatment on the way to the hospital.   Date Last Reviewed: 5/1/2016 2000-2017 [employer]. [address] [place] [address], [place] [id number]. All rights reserved. This information is not intended as a substitute for professional medical care. Always follow your healthcare professional's instructions.          24 Hour Appointment Hotline       To make an appointment at any [facility], call [phone] ([phone]). If you don't have a family doctor or clinic, we will help you find one.  [facility] are conveniently located to serve the needs of you and your family.             Review of your medicines      Our records show that you are taking the medicines listed below. If these are incorrect, please call your family doctor or clinic.        Dose / Directions Last dose taken    aspirin 81 MG tablet   Dose:  1 tablet        Take 1 tablet by mouth daily.   Refills:  0        atenolol 25 MG tablet   Commonly known as:  TENORMIN        Take  by mouth daily. Pt takes 12.5mg daily (he cuts pill in half)   Refills:  0        FLOMAX 0.4 MG capsule   Dose:  0.4 mg   Generic drug:  tamsulosin        Take 0.4 mg by mouth daily.   Refills:  0        GAVISCON  MG/15ML Susp suspension   Dose:  30 mL   Generic drug:  alum hydroxide-mag carbonate        Take 30 mLs by mouth 2 times daily as needed.   Refills:  0        LORazepam 0.5 MG tablet   Commonly known as:  ATIVAN   Dose:  0.5 mg        Take 0.5 mg by mouth every 6 hours as needed.   Refills:  0        metFORMIN 500 MG tablet   Commonly known as:  GLUCOPHAGE   Dose:  500 mg        Take 500 mg by mouth 2 times daily (with meals).   Refills:  0        simvastatin 10 MG tablet   Commonly known as:  ZOCOR   Dose:  10 mg        Take 10 mg by mouth At Bedtime.   Refills:  0                Procedures and tests performed during your visit     Procedure/Test Number of Times Performed    Basic metabolic panel 1    CBC with platelets, differential 1    EKG 12 lead 2    INR 1    PTT 1    Troponin I 1      Orders Needing Specimen Collection     None      Pending Results     No orders found from 5/20/2018 to 5/23/2018.            Pending Culture Results     No orders found from 5/20/2018 to 5/23/2018.            Pending Results Instructions     If you had any lab results that were not finalized at the time of your Discharge, you can call the ED Lab Result RN at [phone]. You will be contacted by this team for any positive Lab results or changes in  treatment. The nurses are available 7 days a week from 10A to 6:30P.  You can leave a message 24 hours per day and they will return your call.        Test Results From Your Hospital Stay        5/22/2018  8:57 AM      Component Results     Component Value Ref Range & Units Status    WBC 7.1 4.0 - 11.0 10e9/L Final    RBC Count 5.05 4.4 - 5.9 10e12/L Final    Hemoglobin 15.6 13.3 - 17.7 g/dL Final    Hematocrit 45.4 40.0 - 53.0 % Final    MCV 90 78 - 100 fl Final    MCH 30.9 26.5 - 33.0 pg Final    MCHC 34.4 31.5 - 36.5 g/dL Final    RDW 13.3 10.0 - 15.0 % Final    Platelet Count 170 150 - 450 10e9/L Final    Diff Method Automated Method  Final    % Neutrophils 72.4 % Final    % Lymphocytes 16.2 % Final    % Monocytes 7.9 % Final    % Eosinophils 2.7 % Final    % Basophils 0.4 % Final    % Immature Granulocytes 0.4 % Final    Absolute Neutrophil 5.1 1.6 - 8.3 10e9/L Final    Absolute Lymphocytes 1.1 0.8 - 5.3 10e9/L Final    Absolute Monocytes 0.6 0.0 - 1.3 10e9/L Final    Absolute Eosinophils 0.2 0.0 - 0.7 10e9/L Final    Absolute Basophils 0.0 0.0 - 0.2 10e9/L Final    Abs Immature Granulocytes 0.0 0 - 0.4 10e9/L Final         5/22/2018  9:12 AM      Component Results     Component Value Ref Range & Units Status    Sodium 142 133 - 144 mmol/L Final    Potassium 4.1 3.4 - 5.3 mmol/L Final    Chloride 108 94 - 109 mmol/L Final    Carbon Dioxide 28 20 - 32 mmol/L Final    Anion Gap 6 3 - 14 mmol/L Final    Glucose 219 (H) 70 - 99 mg/dL Final    Urea Nitrogen 16 7 - 30 mg/dL Final    Creatinine 1.01 0.66 - 1.25 mg/dL Final    GFR Estimate 72 >60 mL/min/1.7m2 Final    Non  GFR Calc    GFR Estimate If Black 87 >60 mL/min/1.7m2 Final    African American GFR Calc    Calcium 8.6 8.5 - 10.1 mg/dL Final         5/22/2018  9:12 AM      Component Results     Component Value Ref Range & Units Status    Troponin I ES <0.015 0.000 - 0.045 ug/L Final    The 99th percentile for upper reference range is 0.045 ug/L.   "Troponin values   in the range of 0.045 - 0.120 ug/L may be associated with risks of adverse   clinical events.           2018  9:37 AM      Component Results     Component Value Ref Range & Units Status    PTT 28 22 - 37 sec Final         2018  9:37 AM      Component Results     Component Value Ref Range & Units Status    INR 1.09 0.86 - 1.14 Final                Thank you for choosing [facility]       Thank you for choosing [facility] for your care. Our goal is always to provide you with excellent care. Hearing back from our patients is one way we can continue to improve our services. Please take a few minutes to complete the written survey that you may receive in the mail after you visit with us. Thank you!        [employer] Information     [facility] lets you send messages to your doctor, view your test results, renew your prescriptions, schedule appointments and more. To sign up, go to [email or web address] . Click on \"Log in\" on the left side of the screen, which will take you to the Welcome page. Then click on \"Sign up Now\" on the right side of the page.     You will be asked to enter the access code listed below, as well as some personal information. Please follow the directions to create your username and password.     Your access code is: [email or web address]  Expires: 2018 10:44 AM     Your access code will  in 90 days. If you need help or a new code, please call your [facility] clinic or [phone].        Care EveryWhere ID     This is your Care EveryWhere ID. This could be used by other organizations to access your [facility] medical records  [id number]        Equal Access to Services     [facility]: Hadii genesis wong Soaidee, waaxda luqadaha, qaybta kaalmada juany watts . So [facility] [phone].    ATENCIÓN: Si habla español, tiene a kuo disposición servicios gratuitos de asistencia lingüística. Llame al [phone].    We comply with applicable " federal civil rights laws and [place] laws. We do not discriminate on the basis of race, color, national origin, age, disability, sex, sexual orientation, or gender identity.            After Visit Summary       This is your record. Keep this with you and show to your community pharmacist(s) and doctor(s) at your next visit.

## 2018-05-23 ASSESSMENT — ENCOUNTER SYMPTOMS
BRUISES/BLEEDS EASILY: 0
FEVER: 0
DIZZINESS: 0
BACK PAIN: 0
NUMBNESS: 0
CHEST TIGHTNESS: 0
APPETITE CHANGE: 0
VOMITING: 0
WEAKNESS: 0
NECK PAIN: 0
PALPITATIONS: 1
LIGHT-HEADEDNESS: 1
ACTIVITY CHANGE: 0
DYSURIA: 0
SHORTNESS OF BREATH: 1
TROUBLE SWALLOWING: 0
COUGH: 0
WHEEZING: 0
ABDOMINAL PAIN: 0
NAUSEA: 0

## 2018-05-24 NOTE — ED PROVIDER NOTES
History     Chief Complaint   Patient presents with     Tachycardia     soa and left arm pain started this am     HPI  Kd Hernandez is a 73 year old male with a history of paroxysmal atrial fibrillation and htn who present to the emergency department complaining of palpitations. Patient was bending over to eat his cereal this morning when he began having palpitations with mild sob. He did not have chest apin but did have some L arem pain which a chronic problem . This has happened to him several times and he is usually able to stop with with vagal maneuvers. Patient denies any recent significant caffeine , new medication or missed doses. No alcohol use or recent illness. He has seen cardiology in the past for this.   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    Past Surgical History:    Past Surgical History:   Procedure Laterality Date     HERNIORRHAPHY UMBILICAL  12/6/2011    Procedure:HERNIORRHAPHY UMBILICAL; Umbilical Hernia Repair with Mesh; Surgeon:JESUS ALBERTO RODRÍGUEZ; Location:[facility] OR       Family History:    No family history on file.    Social History:  Marital Status:   [2]  Social History   Substance Use Topics     Smoking status: Not on file     Smokeless tobacco: Not on file     Alcohol use Not on file        Medications:      alum hydroxide-mag carbonate (GAVISCON)  MG/15ML SUSP   aspirin 81 MG tablet   atenolol  "(TENORMIN) 25 MG tablet   lorazepam (ATIVAN) 0.5 MG tablet   MetFORmin (GLUCOPHAGE) 500 MG tablet   simvastatin (ZOCOR) 10 MG tablet   tamsulosin (FLOMAX) 0.4 MG 24 hr capsule         Review of Systems   Constitutional: Negative for activity change, appetite change and fever.   HENT: Negative for congestion and trouble swallowing.    Respiratory: Positive for shortness of breath. Negative for cough, chest tightness and wheezing.    Cardiovascular: Positive for palpitations. Negative for leg swelling.   Gastrointestinal: Negative for abdominal pain, nausea and vomiting.   Genitourinary: Negative for decreased urine volume and dysuria.   Musculoskeletal: Negative for back pain and neck pain.   Skin: Negative for rash.   Neurological: Positive for light-headedness. Negative for dizziness, weakness and numbness.   Hematological: Does not bruise/bleed easily.       Physical Exam   BP: (!) 130/91  Heart Rate: 134  Temp: 97.9  F (36.6  C)  Resp: 18  Height: 177.8 cm (5' 10\")  Weight: 78.9 kg (174 lb)  SpO2: 98 %      Physical Exam   Constitutional: He appears well-developed and well-nourished. No distress.   HENT:   Mouth/Throat: Oropharynx is clear and moist. No oropharyngeal exudate.   Eyes: Conjunctivae are normal.   Neck: Normal range of motion. Neck supple.   Cardiovascular:   Tachycardia; difficult to discern a murmur.    Pulmonary/Chest: Effort normal and breath sounds normal. He has no wheezes. He has no rales.   Abdominal: Soft. There is no tenderness.   Musculoskeletal: Normal range of motion. He exhibits no edema or tenderness.   Neurological: He is alert. He exhibits normal muscle tone.   Skin: Skin is warm and dry. No rash noted.   Psychiatric: He has a normal mood and affect.   Nursing note and vitals reviewed.      ED Course     ED Course          EKG Interpretation:      Interpreted by Sanchez Mae  Rhythm:supraventricular tachycardia  Rate: 130-140  Axis: Normal  Ectopy: none  Conduction: normal  ST " Segments/ T Waves: ST Segment Depression   Q Waves: none  Comparison to prior: SVT compared to 12/12/17.     Clinical Impression: supraventricular tachycardia      ekg #2 : post medication; interpreted By dr. Mae; nsr with no st twave abnormality no change for post chemical cardioversion on 12/12;/17.         Procedures              Critical Care time:  was 30 minutes for this patient excluding procedures.               Results for orders placed or performed during the hospital encounter of 08/10/14   Chest XR,  PA & LAT    Narrative    XR CHEST 2 VW   8/10/2014 9:55 AM     HISTORY: Cough and fever.    COMPARISON: 7/29/2012.      Impression    IMPRESSION: No acute cardiopulmonary disease.    KATHY JAY MD         Medications   0.9% sodium chloride BOLUS (0 mLs Intravenous Stopped 5/22/18 1045)   adenosine (ADENOCARD) injection 6 mg (6 mg Intravenous Given 5/22/18 0905)   adenosine (ADENOCARD) injection 12 mg (12 mg Intravenous Given 5/22/18 0911)       Assessments & Plan (with Medical Decision Making) records were reviewed from previous visits. ekg with SVT. Patient was given a fluids bolus and then was given Adenosine 6 mg with minimal results. So patient was given 12 mg with a long pause and then conversion to a sinus rhythm. Whitecount was 7.1 hgb was 15.6 . No L shift was present. BMp significant for a glucose of 219. troponin was within normal limits.CXR without acute cardiovascular abnormality.  Pt. Was observed for over an hour and and had no complaints. He feels comfortable going home at this time. He will follow up with his cardiologist in the morning. He will return if symptoms worsen or new symptoms develop.      I have reviewed the nursing notes.    I have reviewed the findings, diagnosis, plan and need for follow up with the patient.       Discharge Medication List as of 5/22/2018 10:45 AM          Final diagnoses:   Paroxysmal supraventricular tachycardia (H)       5/22/2018   [facility]  EMERGENCY DEPARTMENT     Sanchez Mae MD  05/23/18 3235

## 2019-08-15 ASSESSMENT — MIFFLIN-ST. JEOR: SCORE: 1504.38

## 2019-08-19 ENCOUNTER — ANESTHESIA - HEALTHEAST (OUTPATIENT)
Dept: SURGERY | Facility: HOSPITAL | Age: 74
End: 2019-08-19

## 2019-08-20 ENCOUNTER — DOCUMENTATION ONLY (OUTPATIENT)
Dept: OTHER | Facility: CLINIC | Age: 74
End: 2019-08-20

## 2019-08-20 ENCOUNTER — AMBULATORY - HEALTHEAST (OUTPATIENT)
Dept: OTHER | Facility: CLINIC | Age: 74
End: 2019-08-20

## 2019-08-20 ENCOUNTER — SURGERY - HEALTHEAST (OUTPATIENT)
Dept: SURGERY | Facility: HOSPITAL | Age: 74
End: 2019-08-20

## 2019-08-20 ASSESSMENT — MIFFLIN-ST. JEOR
SCORE: 1502.37
SCORE: 1501.01

## 2019-11-19 ENCOUNTER — HOSPITAL ENCOUNTER (EMERGENCY)
Facility: CLINIC | Age: 74
Discharge: HOME | End: 2019-11-20
Attending: EMERGENCY MEDICINE | Admitting: EMERGENCY MEDICINE
Payer: COMMERCIAL

## 2019-11-19 DIAGNOSIS — I47.10 PAROXYSMAL SUPRAVENTRICULAR TACHYCARDIA (H): ICD-10-CM

## 2019-11-19 PROCEDURE — 93005 ELECTROCARDIOGRAM TRACING: CPT | Mod: 76 | Performed by: EMERGENCY MEDICINE

## 2019-11-19 PROCEDURE — 93010 ELECTROCARDIOGRAM REPORT: CPT | Mod: Z6 | Performed by: EMERGENCY MEDICINE

## 2019-11-19 PROCEDURE — 93005 ELECTROCARDIOGRAM TRACING: CPT | Performed by: EMERGENCY MEDICINE

## 2019-11-19 PROCEDURE — 85025 COMPLETE CBC W/AUTO DIFF WBC: CPT | Performed by: EMERGENCY MEDICINE

## 2019-11-19 PROCEDURE — 99285 EMERGENCY DEPT VISIT HI MDM: CPT | Performed by: EMERGENCY MEDICINE

## 2019-11-19 PROCEDURE — 80048 BASIC METABOLIC PNL TOTAL CA: CPT | Performed by: EMERGENCY MEDICINE

## 2019-11-19 PROCEDURE — 93010 ELECTROCARDIOGRAM REPORT: CPT | Mod: 76 | Performed by: EMERGENCY MEDICINE

## 2019-11-19 PROCEDURE — 99284 EMERGENCY DEPT VISIT MOD MDM: CPT | Mod: 25 | Performed by: EMERGENCY MEDICINE

## 2019-11-19 NOTE — ED AVS SNAPSHOT
[facility] Emergency Department  [address] [facility] [address]  Phone:  [phone]  Fax:  [phone]                                    Kd Hernandez   MRN: [id number]    Department:  [facility] Emergency Department   Date of Visit:  11/19/2019           After Visit Summary Signature Page    I have received my discharge instructions, and my questions have been answered. I have discussed any challenges I see with this plan with the nurse or doctor.    ..........................................................................................................................................  Patient/Patient Representative Signature      ..........................................................................................................................................  Patient Representative Print Name and Relationship to Patient    ..................................................               ................................................  Date                                   Time    ..........................................................................................................................................  Reviewed by Signature/Title    ...................................................              ..............................................  Date                                               Time          22EPIC Rev 08/18

## 2019-11-20 VITALS
RESPIRATION RATE: 16 BRPM | DIASTOLIC BLOOD PRESSURE: 84 MMHG | WEIGHT: 176 LBS | BODY MASS INDEX: 25.2 KG/M2 | HEART RATE: 82 BPM | HEIGHT: 70 IN | SYSTOLIC BLOOD PRESSURE: 113 MMHG | TEMPERATURE: 97.8 F | OXYGEN SATURATION: 99 %

## 2019-11-20 LAB
ANION GAP SERPL CALCULATED.3IONS-SCNC: 2 MMOL/L (ref 3–14)
BASOPHILS # BLD AUTO: 0.1 10E9/L (ref 0–0.2)
BASOPHILS NFR BLD AUTO: 0.4 %
BUN SERPL-MCNC: 17 MG/DL (ref 7–30)
CALCIUM SERPL-MCNC: 9 MG/DL (ref 8.5–10.1)
CHLORIDE SERPL-SCNC: 107 MMOL/L (ref 94–109)
CO2 SERPL-SCNC: 32 MMOL/L (ref 20–32)
CREAT SERPL-MCNC: 1.15 MG/DL (ref 0.66–1.25)
DIFFERENTIAL METHOD BLD: ABNORMAL
EOSINOPHIL # BLD AUTO: 0.2 10E9/L (ref 0–0.7)
EOSINOPHIL NFR BLD AUTO: 1.2 %
ERYTHROCYTE [DISTWIDTH] IN BLOOD BY AUTOMATED COUNT: 12.7 % (ref 10–15)
GFR SERPL CREATININE-BSD FRML MDRD: 62 ML/MIN/{1.73_M2}
GLUCOSE SERPL-MCNC: 148 MG/DL (ref 70–99)
HCT VFR BLD AUTO: 49.1 % (ref 40–53)
HGB BLD-MCNC: 16.4 G/DL (ref 13.3–17.7)
IMM GRANULOCYTES # BLD: 0.1 10E9/L (ref 0–0.4)
IMM GRANULOCYTES NFR BLD: 0.6 %
LYMPHOCYTES # BLD AUTO: 1.8 10E9/L (ref 0.8–5.3)
LYMPHOCYTES NFR BLD AUTO: 11.4 %
MCH RBC QN AUTO: 30.5 PG (ref 26.5–33)
MCHC RBC AUTO-ENTMCNC: 33.4 G/DL (ref 31.5–36.5)
MCV RBC AUTO: 91 FL (ref 78–100)
MONOCYTES # BLD AUTO: 1.1 10E9/L (ref 0–1.3)
MONOCYTES NFR BLD AUTO: 7.2 %
NEUTROPHILS # BLD AUTO: 12.5 10E9/L (ref 1.6–8.3)
NEUTROPHILS NFR BLD AUTO: 79.2 %
NRBC # BLD AUTO: 0 10*3/UL
NRBC BLD AUTO-RTO: 0 /100
PLATELET # BLD AUTO: 208 10E9/L (ref 150–450)
POTASSIUM SERPL-SCNC: 3.5 MMOL/L (ref 3.4–5.3)
RBC # BLD AUTO: 5.37 10E12/L (ref 4.4–5.9)
SODIUM SERPL-SCNC: 141 MMOL/L (ref 133–144)
WBC # BLD AUTO: 15.8 10E9/L (ref 4–11)

## 2019-11-20 ASSESSMENT — ENCOUNTER SYMPTOMS
COUGH: 0
CHEST TIGHTNESS: 0
WEAKNESS: 0
ABDOMINAL PAIN: 0
CONFUSION: 0
APPETITE CHANGE: 0
FATIGUE: 0
SHORTNESS OF BREATH: 0
DIAPHORESIS: 0
CHILLS: 0
PALPITATIONS: 1
BACK PAIN: 0
DIZZINESS: 0
FEVER: 0
LIGHT-HEADEDNESS: 0
HEADACHES: 0

## 2019-11-20 ASSESSMENT — MIFFLIN-ST. JEOR: SCORE: 1539.58

## 2019-11-20 NOTE — ED TRIAGE NOTES
States he woke up to get another blanket and noticed his heart racing. Attempted vagal maneuvers and put his face in ice without relief.   Clementina Andino RN on 11/19/2019 at 11:56 PM

## 2019-11-20 NOTE — ED PROVIDER NOTES
"  History     Chief Complaint   Patient presents with     Tachycardia     HPI  Kd Hernandez is a 74 year old male with history of hypertension, hyperlipidemia, and episodes of PSVT presenting for evaluation of palpitations.  Patient reports he woke up at night feeling palpitations similar to episodes of SVT he has had in the past.  He tried several vagal maneuvers without improvement.  Denies any lightheadedness or dizziness.  Denies chest pain or difficulty breathing.  When symptoms persisted but did not resolve, he came in for evaluation.  Upon arrival, an IV was established which converted him from a narrow complex tachycardic rhythm to sinus.  Patient's palpitations resolved.  Patient reports feeling well recently.  No recent cough or cold medicine use.  No recent unusual caffeine use.  No other stimulant use.  Patient reports his previous episodes of been unprovoked and spontaneous without clear cause.  Patient reports he had a very brief episode lasting a few minutes several weeks ago but prior to that it is been almost a year since his last episode.    Allergies:  Allergies   Allergen Reactions     Sudafed [Pseudoephedrine Hcl]      jittery,insomnia     Sudafed [Pseudoephedrine Hcl]      Made \"jittery\".  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    Past Surgical History:    Past Surgical History:   Procedure " "Laterality Date     HERNIORRHAPHY UMBILICAL  12/6/2011    Procedure:HERNIORRHAPHY UMBILICAL; Umbilical Hernia Repair with Mesh; Surgeon:JESUS ALBERTO RODRÍGUEZ; Location:[place] OR       Family History:    No family history on file.    Social History:  Marital Status:   [2]  Social History     Tobacco Use     Smoking status: Not on file   Substance Use Topics     Alcohol use: Not on file     Drug use: Not on file        Medications:    alum hydroxide-mag carbonate (GAVISCON)  MG/15ML SUSP  aspirin 81 MG tablet  atenolol (TENORMIN) 25 MG tablet  lorazepam (ATIVAN) 0.5 MG tablet  MetFORmin (GLUCOPHAGE) 500 MG tablet  simvastatin (ZOCOR) 10 MG tablet  tamsulosin (FLOMAX) 0.4 MG 24 hr capsule          Review of Systems   Constitutional: Negative for appetite change, chills, diaphoresis, fatigue and fever.   HENT: Negative for congestion.    Eyes: Negative for visual disturbance.   Respiratory: Negative for cough, chest tightness and shortness of breath.    Cardiovascular: Positive for palpitations. Negative for chest pain.   Gastrointestinal: Negative for abdominal pain.   Musculoskeletal: Negative for back pain.   Skin: Negative for rash.   Neurological: Negative for dizziness, weakness, light-headedness and headaches.   Psychiatric/Behavioral: Negative for confusion.   All other systems reviewed and are negative.      Physical Exam   BP: 100/80  Pulse: 146  Heart Rate: 154  Temp: 97.8  F (36.6  C)  Resp: 13  Height: 177.8 cm (5' 10\")  Weight: 79.8 kg (176 lb)  SpO2: 98 %      Physical Exam  Vitals signs and nursing note reviewed.   Constitutional:       Appearance: Normal appearance. He is not ill-appearing or diaphoretic.   HENT:      Head: Normocephalic and atraumatic.      Nose: Nose normal.      Mouth/Throat:      Mouth: Mucous membranes are moist.   Eyes:      Conjunctiva/sclera: Conjunctivae normal.   Cardiovascular:      Rate and Rhythm: Normal rate and regular rhythm.      Pulses: Normal pulses.      Heart " sounds: Normal heart sounds.   Pulmonary:      Effort: Pulmonary effort is normal.      Breath sounds: Normal breath sounds.   Musculoskeletal: Normal range of motion.   Skin:     General: Skin is warm and dry.      Capillary Refill: Capillary refill takes less than 2 seconds.   Neurological:      Mental Status: He is alert and oriented to person, place, and time.   Psychiatric:         Mood and Affect: Mood normal.         ED Course        Procedures                  EKG Interpretation:      Interpreted by Matt Hung MD  Time reviewed: 0000  Symptoms at time of EKG: palpitations   Rhythm: normal sinus   Rate: Normal  Axis: Normal  Ectopy: none  Conduction: normal  ST Segments/ T Waves: ST Segment Depression I, II, V3, V4, V5 and V6  Q Waves: none  Comparison to prior: lateral st depressions new compared to EKG 5/22/18    Clinical Impression: Sinus rhythm with lateral ST depressions           EKG Interpretation:      Interpreted by Matt Hung MD  Time reviewed: 0058  Symptoms at time of EKG: None   Rhythm: normal sinus   Rate: Normal  Axis: Normal  Ectopy: none  Conduction: normal  ST Segments/ T Waves: No acute ischemic changes  Q Waves: none  Comparison to prior: Resolution of previous ST depressions    Clinical Impression: normal EKG               Results for orders placed or performed during the hospital encounter of 11/19/19 (from the past 24 hour(s))   CBC with platelets differential   Result Value Ref Range    WBC 15.8 (H) 4.0 - 11.0 10e9/L    RBC Count 5.37 4.4 - 5.9 10e12/L    Hemoglobin 16.4 13.3 - 17.7 g/dL    Hematocrit 49.1 40.0 - 53.0 %    MCV 91 78 - 100 fl    MCH 30.5 26.5 - 33.0 pg    MCHC 33.4 31.5 - 36.5 g/dL    RDW 12.7 10.0 - 15.0 %    Platelet Count 208 150 - 450 10e9/L    Diff Method Automated Method     % Neutrophils 79.2 %    % Lymphocytes 11.4 %    % Monocytes 7.2 %    % Eosinophils 1.2 %    % Basophils 0.4 %    % Immature Granulocytes 0.6 %    Nucleated RBCs 0 0  /100    Absolute Neutrophil 12.5 (H) 1.6 - 8.3 10e9/L    Absolute Lymphocytes 1.8 0.8 - 5.3 10e9/L    Absolute Monocytes 1.1 0.0 - 1.3 10e9/L    Absolute Eosinophils 0.2 0.0 - 0.7 10e9/L    Absolute Basophils 0.1 0.0 - 0.2 10e9/L    Abs Immature Granulocytes 0.1 0 - 0.4 10e9/L    Absolute Nucleated RBC 0.0    Basic metabolic panel   Result Value Ref Range    Sodium 141 133 - 144 mmol/L    Potassium 3.5 3.4 - 5.3 mmol/L    Chloride 107 94 - 109 mmol/L    Carbon Dioxide 32 20 - 32 mmol/L    Anion Gap 2 (L) 3 - 14 mmol/L    Glucose 148 (H) 70 - 99 mg/dL    Urea Nitrogen 17 7 - 30 mg/dL    Creatinine 1.15 0.66 - 1.25 mg/dL    GFR Estimate 62 >60 mL/min/[1.73_m2]    GFR Estimate If Black 71 >60 mL/min/[1.73_m2]    Calcium 9.0 8.5 - 10.1 mg/dL       Medications - No data to display    Assessments & Plan (with Medical Decision Making)  74-year-old male with history of hypertension, hyperlipidemia, and SVT in the past presenting for evaluation of palpitations tonight.  Symptoms lasted for roughly 1 to 2 hours before resolving spontaneously in the ED when an IV was established.  Patient had no further palpitations.  Initial EKG showed some lateral strain which resolved on repeat.  Labs showed a slight leukocytosis, likely reactive.  Patient had no further symptoms.  Discharged home with plan for primary care follow-up     I have reviewed the nursing notes.    I have reviewed the findings, diagnosis, plan and need for follow up with the patient.       Discharge Medication List as of 11/20/2019  1:09 AM          Final diagnoses:   Paroxysmal supraventricular tachycardia (H)       11/19/2019   [facility] EMERGENCY DEPARTMENT     Hung, Matt Hines MD  11/20/19 0131

## 2020-03-03 ENCOUNTER — HOSPITAL ENCOUNTER (EMERGENCY)
Facility: CLINIC | Age: 75
Discharge: HOME | End: 2020-03-03
Attending: FAMILY MEDICINE | Admitting: FAMILY MEDICINE
Payer: COMMERCIAL

## 2020-03-03 VITALS
OXYGEN SATURATION: 98 % | SYSTOLIC BLOOD PRESSURE: 121 MMHG | DIASTOLIC BLOOD PRESSURE: 76 MMHG | RESPIRATION RATE: 16 BRPM | HEART RATE: 64 BPM

## 2020-03-03 DIAGNOSIS — I47.10 PAROXYSMAL SUPRAVENTRICULAR TACHYCARDIA (H): ICD-10-CM

## 2020-03-03 LAB
ANION GAP SERPL CALCULATED.3IONS-SCNC: 3 MMOL/L (ref 3–14)
BASOPHILS # BLD AUTO: 0.1 10E9/L (ref 0–0.2)
BASOPHILS NFR BLD AUTO: 0.6 %
BUN SERPL-MCNC: 19 MG/DL (ref 7–30)
CALCIUM SERPL-MCNC: 8.7 MG/DL (ref 8.5–10.1)
CHLORIDE SERPL-SCNC: 110 MMOL/L (ref 94–109)
CO2 SERPL-SCNC: 29 MMOL/L (ref 20–32)
CREAT SERPL-MCNC: 1.08 MG/DL (ref 0.66–1.25)
DIFFERENTIAL METHOD BLD: NORMAL
EOSINOPHIL # BLD AUTO: 0.2 10E9/L (ref 0–0.7)
EOSINOPHIL NFR BLD AUTO: 1.9 %
ERYTHROCYTE [DISTWIDTH] IN BLOOD BY AUTOMATED COUNT: 12.9 % (ref 10–15)
GFR SERPL CREATININE-BSD FRML MDRD: 66 ML/MIN/{1.73_M2}
GLUCOSE SERPL-MCNC: 163 MG/DL (ref 70–99)
HCT VFR BLD AUTO: 45.6 % (ref 40–53)
HGB BLD-MCNC: 15.3 G/DL (ref 13.3–17.7)
IMM GRANULOCYTES # BLD: 0.1 10E9/L (ref 0–0.4)
IMM GRANULOCYTES NFR BLD: 0.8 %
LYMPHOCYTES # BLD AUTO: 2 10E9/L (ref 0.8–5.3)
LYMPHOCYTES NFR BLD AUTO: 19.3 %
MCH RBC QN AUTO: 30.7 PG (ref 26.5–33)
MCHC RBC AUTO-ENTMCNC: 33.6 G/DL (ref 31.5–36.5)
MCV RBC AUTO: 92 FL (ref 78–100)
MONOCYTES # BLD AUTO: 0.8 10E9/L (ref 0–1.3)
MONOCYTES NFR BLD AUTO: 7.8 %
NEUTROPHILS # BLD AUTO: 7.1 10E9/L (ref 1.6–8.3)
NEUTROPHILS NFR BLD AUTO: 69.6 %
NRBC # BLD AUTO: 0 10*3/UL
NRBC BLD AUTO-RTO: 0 /100
PLATELET # BLD AUTO: 181 10E9/L (ref 150–450)
POTASSIUM SERPL-SCNC: 3.5 MMOL/L (ref 3.4–5.3)
RBC # BLD AUTO: 4.98 10E12/L (ref 4.4–5.9)
SODIUM SERPL-SCNC: 142 MMOL/L (ref 133–144)
WBC # BLD AUTO: 10.1 10E9/L (ref 4–11)

## 2020-03-03 PROCEDURE — 99285 EMERGENCY DEPT VISIT HI MDM: CPT | Mod: 25 | Performed by: FAMILY MEDICINE

## 2020-03-03 PROCEDURE — 93010 ELECTROCARDIOGRAM REPORT: CPT | Mod: 76 | Performed by: FAMILY MEDICINE

## 2020-03-03 PROCEDURE — 85025 COMPLETE CBC W/AUTO DIFF WBC: CPT | Performed by: FAMILY MEDICINE

## 2020-03-03 PROCEDURE — 93005 ELECTROCARDIOGRAM TRACING: CPT | Performed by: FAMILY MEDICINE

## 2020-03-03 PROCEDURE — 93010 ELECTROCARDIOGRAM REPORT: CPT | Mod: Z6 | Performed by: FAMILY MEDICINE

## 2020-03-03 PROCEDURE — 25000128 H RX IP 250 OP 636: Performed by: FAMILY MEDICINE

## 2020-03-03 PROCEDURE — 93005 ELECTROCARDIOGRAM TRACING: CPT | Mod: 76 | Performed by: FAMILY MEDICINE

## 2020-03-03 PROCEDURE — 80048 BASIC METABOLIC PNL TOTAL CA: CPT | Performed by: FAMILY MEDICINE

## 2020-03-03 PROCEDURE — 96374 THER/PROPH/DIAG INJ IV PUSH: CPT | Performed by: FAMILY MEDICINE

## 2020-03-03 RX ORDER — ADENOSINE 3 MG/ML
6 INJECTION, SOLUTION INTRAVENOUS ONCE
Status: COMPLETED | OUTPATIENT
Start: 2020-03-03 | End: 2020-03-03

## 2020-03-03 RX ORDER — ADENOSINE 3 MG/ML
12 INJECTION, SOLUTION INTRAVENOUS ONCE
Status: COMPLETED | OUTPATIENT
Start: 2020-03-03 | End: 2020-03-03

## 2020-03-03 RX ORDER — ADENOSINE 3 MG/ML
12 INJECTION, SOLUTION INTRAVENOUS ONCE
Status: DISCONTINUED | OUTPATIENT
Start: 2020-03-03 | End: 2020-03-03 | Stop reason: HOSPADM

## 2020-03-03 RX ADMIN — ADENOSINE 12 MG: 3 INJECTION INTRAVENOUS at 02:14

## 2020-03-03 RX ADMIN — ADENOSINE 6 MG: 3 INJECTION INTRAVENOUS at 02:13

## 2020-03-03 NOTE — DISCHARGE INSTRUCTIONS
Push fluids, rest.  Continue your atenolol as previously directed.  Follow-up with cardiology for discussion of potential ablation.  Return to the emergency department if recurrent and not resolving spontaneously in 1 to 2 hours.

## 2020-03-03 NOTE — ED PROVIDER NOTES
"  History     Chief Complaint   Patient presents with     Tachycardia     Started approx 1 hr ago     HPI  Kd Hernandez is a 75 year old male, past medical history is significant for esophagitis, BPH, hyperlipidemia, superficial thrombophlebitis, hypertension, history of SVT, presents to the emergency department with concerns of rapid heart rate awakening him from sleep approximately 1 hour prior to presentation.  The patient states that he has had this happen to him at least 5 or 6 times in the past and has had this sometimes improve on its own but often requiring the use of some type of injection in the emergency department.  He states he has never been shocked for it.  He has seen cardiology and is currently on atenolol, they have considered ablation but he was told that he needs to have it at least once a month before they consider this more seriously.  Currently on atenolol 25 mg once daily, he notes that his heart rate at rest is usually in the 50-60 range with this.  Tonight's episode awoke him from sleep, is associated with a weird sensation in his chest but not pain or pressure or tightness.  He does not feel shortness of breath.  He tried Valsalva at home he also tried immersing his face and cold ice water, he also tried a right-sided carotid sinus massage by description.  None of these worked.  There are no atypical features to this episode.    Allergies:  Allergies   Allergen Reactions     Sudafed [Pseudoephedrine Hcl]      jittery,insomnia     Sudafed [Pseudoephedrine Hcl]      Made \"jittery\".  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    Past Surgical History:    Past Surgical History:   Procedure Laterality Date     HERNIORRHAPHY UMBILICAL  12/6/2011    Procedure:HERNIORRHAPHY UMBILICAL; Umbilical Hernia Repair with Mesh; Surgeon:JESUS ALBERTO RDORÍGUEZ; Location:[place] OR       Family History:    No family history on file.    Social History:  Marital Status:   [2]  Social History     Tobacco Use     Smoking status: Not on file   Substance Use Topics     Alcohol use: Not on file     Drug use: Not on file        Medications:    alum hydroxide-mag carbonate (GAVISCON)  MG/15ML SUSP  aspirin 81 MG tablet  atenolol (TENORMIN) 25 MG tablet  lorazepam (ATIVAN) 0.5 MG tablet  MetFORmin (GLUCOPHAGE) 500 MG tablet  simvastatin (ZOCOR) 10 MG tablet  tamsulosin (FLOMAX) 0.4 MG 24 hr capsule          Review of Systems   All other systems reviewed and are negative.      Physical Exam   BP: 128/87  Pulse: 140  Heart Rate: 142  Resp: 16  SpO2: 99 %      Physical Exam  Vitals signs and nursing note reviewed.   Constitutional:       General: He is not in acute distress.     Appearance: Normal appearance. He is normal weight. He is not ill-appearing, toxic-appearing or diaphoretic.      Comments: Alert, jovial, comfortable.  No acute distress.   HENT:      Head: Normocephalic and atraumatic.      Right Ear: Tympanic membrane normal.      Left Ear: Tympanic membrane normal.      Nose: Nose normal.      Mouth/Throat:      Mouth: Mucous membranes are dry.      Pharynx: Oropharynx is clear.   Eyes:      Extraocular Movements: Extraocular movements intact.      Conjunctiva/sclera: Conjunctivae normal.      Pupils: Pupils are equal, round, and reactive to light.   Neck:      Musculoskeletal: Normal range of motion and neck supple.    Cardiovascular:      Rate and Rhythm: Regular rhythm. Tachycardia present.   Pulmonary:      Effort: Pulmonary effort is normal.      Breath sounds: Normal breath sounds.   Abdominal:      General: Bowel sounds are normal.      Palpations: Abdomen is soft.   Musculoskeletal: Normal range of motion.   Skin:     General: Skin is warm and dry.      Capillary Refill: Capillary refill takes less than 2 seconds.   Neurological:      General: No focal deficit present.      Mental Status: He is alert.   Psychiatric:         Mood and Affect: Mood normal.         Behavior: Behavior normal.         ED Course        Procedures               EKG Interpretation:      Interpreted by Marty Luna MD  Time reviewed: EKG #1  Time obtained 01 55 time interpreted same.  Sinus tachycardia at 144 bpm, rate related ST segment changes with ST segment depression noted in leads V2 through V6, all lead I, II, and aVF.  There is no ST segment elevation.         EKG Interpretation:      Interpreted by Marty Luna MD  Time reviewed: EKG #2 this is performed after the administration of initial 6 mg of Identicard and subsequently 12 mg of Adenocard.  Time obtained 2:18 AM time interpreted same.  61 bpm, sinus rhythm, no acute ST-T wave changes.  Normal axis.  Normal intervals.  Impression normal EKG.          Critical Care time:  was 15 minutes for this patient excluding procedures.               Results for orders placed or performed during the hospital encounter of 03/03/20 (from the past 24 hour(s))   CBC with platelets differential   Result Value Ref Range    WBC 10.1 4.0 - 11.0 10e9/L    RBC Count 4.98 4.4 - 5.9 10e12/L    Hemoglobin 15.3 13.3 - 17.7 g/dL    Hematocrit 45.6 40.0 - 53.0 %    MCV 92 78 - 100 fl    MCH 30.7 26.5 - 33.0 pg    MCHC 33.6 31.5 - 36.5 g/dL    RDW 12.9 10.0 - 15.0 %    Platelet Count 181 150 - 450 10e9/L    Diff Method Automated Method     % Neutrophils 69.6 %    % Lymphocytes 19.3 %    %  Monocytes 7.8 %    % Eosinophils 1.9 %    % Basophils 0.6 %    % Immature Granulocytes 0.8 %    Nucleated RBCs 0 0 /100    Absolute Neutrophil 7.1 1.6 - 8.3 10e9/L    Absolute Lymphocytes 2.0 0.8 - 5.3 10e9/L    Absolute Monocytes 0.8 0.0 - 1.3 10e9/L    Absolute Eosinophils 0.2 0.0 - 0.7 10e9/L    Absolute Basophils 0.1 0.0 - 0.2 10e9/L    Abs Immature Granulocytes 0.1 0 - 0.4 10e9/L    Absolute Nucleated RBC 0.0    Basic metabolic panel   Result Value Ref Range    Sodium 142 133 - 144 mmol/L    Potassium 3.5 3.4 - 5.3 mmol/L    Chloride 110 (H) 94 - 109 mmol/L    Carbon Dioxide 29 20 - 32 mmol/L    Anion Gap 3 3 - 14 mmol/L    Glucose 163 (H) 70 - 99 mg/dL    Urea Nitrogen 19 7 - 30 mg/dL    Creatinine 1.08 0.66 - 1.25 mg/dL    GFR Estimate 66 >60 mL/min/[1.73_m2]    GFR Estimate If Black 77 >60 mL/min/[1.73_m2]    Calcium 8.7 8.5 - 10.1 mg/dL       Medications   adenosine (ADENOCARD) injection 12 mg (12 mg Intravenous Not Given 3/3/20 0218)   adenosine (ADENOCARD) injection 6 mg (6 mg Intravenous Given 3/3/20 0213)   adenosine (ADENOCARD) injection 12 mg (12 mg Intravenous Given 3/3/20 0214)   3:19 AM  Patient remains asymptomatic lab diagnostics were discussed with him and he would like to go home which I think is reasonable.  He has plans to follow-up with cardiology to discuss ablation and does not want to discuss or consider increasing medication for potential rate control.  Based upon this experience and previous experience should he experience additional episodes at home he will return to the emergency department if they do not resolve within half hour to 45 minutes.      Assessments & Plan (with Medical Decision Making)   Assessments and plan with medical decision making at the time stamp above.  Disposition is to home in improved condition.    Disclaimer: This note consists of symbols derived from keyboarding, dictation and/or voice recognition software. As a result, there may be errors in the script that  have gone undetected. Please consider this when interpreting information found in this chart.      I have reviewed the nursing notes.    I have reviewed the findings, diagnosis, plan and need for follow up with the patient.       New Prescriptions    No medications on file       Final diagnoses:   Paroxysmal supraventricular tachycardia (H)       3/3/2020   [facility] EMERGENCY DEPARTMENT     Marty Luna MD  03/07/20 5305

## 2020-03-03 NOTE — ED AVS SNAPSHOT
[facility] Emergency Department  [address] [address]  Phone:  [phone]  Fax:  [phone]                                    Kd Hernandez   MRN: [id number]    Department:  [facility] Emergency Department   Date of Visit:  3/3/2020           After Visit Summary Signature Page    I have received my discharge instructions, and my questions have been answered. I have discussed any challenges I see with this plan with the nurse or doctor.    ..........................................................................................................................................  Patient/Patient Representative Signature      ..........................................................................................................................................  Patient Representative Print Name and Relationship to Patient    ..................................................               ................................................  Date                                   Time    ..........................................................................................................................................  Reviewed by Signature/Title    ...................................................              ..............................................  Date                                               Time          22EPIC Rev 08/18

## 2021-05-31 NOTE — ANESTHESIA CARE TRANSFER NOTE
Last vitals:   Vitals:    08/20/19 0743   BP: 147/79   Pulse: (!) 47   Resp: 16   Temp: 36.6  C (97.8  F)   SpO2: 100%     Patient's level of consciousness is awake  Spontaneous respirations: yes  Maintains airway independently: yes  Dentition unchanged: yes  Oropharynx: oropharynx clear of all foreign objects    QCDR Measures:  ASA# 20 - Surgical Safety Checklist: WHO surgical safety checklist completed prior to induction    PQRS# 430 - Adult PONV Prevention: 4558F - Pt received => 2 anti-emetic agents (different classes) preop & intraop  ASA# 8 - Peds PONV Prevention: 4558F - Pt received => 2 anti-emetic agents (different classes) preop & intraop  PQRS# 424 - Sabi-op Temp Management: 4559F - At least one body temp DOCUMENTED => 35.5C or 95.9F within required timeframe  PQRS# 426 - PACU Transfer Protocol: - Transfer of care checklist used  ASA# 14 - Acute Post-op Pain: ASA14B - Patient did NOT experience pain >= 7 out of 10   57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  Palliative consulted for pain management.

## 2021-05-31 NOTE — ANESTHESIA POSTPROCEDURE EVALUATION
Patient: Kd Hernandez  BIPOLAR TRANSURETHRAL RESECTION OF PROSTATE  Anesthesia type: general    Patient location: PACU  Last vitals:   Vitals Value Taken Time   /79 8/20/2019 11:10 AM   Temp 36.1  C (97  F) 8/20/2019 11:00 AM   Pulse 40 8/20/2019 11:14 AM   Resp 24 8/20/2019 11:14 AM   SpO2 83 % 8/20/2019 11:14 AM   Vitals shown include unvalidated device data.  Post vital signs: stable; pulse remains at 41 which is his nl rate, he is asymptomatic  Level of consciousness: alert and conversant  Post-anesthesia pain: pain controlled  Post-anesthesia nausea and vomiting: no  Pulmonary: room air  Cardiovascular: stable and blood pressure at baseline  Hydration: adequate  Anesthetic events: no    QCDR Measures:  ASA# 11 - Sabi-op Cardiac Arrest: ASA11B - Patient did NOT experience unanticipated cardiac arrest  ASA# 12 - Sabi-op Mortality Rate: ASA12B - Patient did NOT die  ASA# 13 - PACU Re-Intubation Rate: ASA13B - Patient did NOT require a new airway mgmt  ASA# 10 - Composite Anes Safety: ASA10A - No serious adverse event    Additional Notes:

## 2021-06-03 VITALS — HEIGHT: 69 IN | WEIGHT: 173.2 LBS | BODY MASS INDEX: 25.65 KG/M2

## 2021-07-03 NOTE — ANESTHESIA PREPROCEDURE EVALUATION
Anesthesia Preprocedure Evaluation by Saud Nicole MD at 8/20/2019  7:35 AM     Author: Saud Nicole MD Service: -- Author Type: Physician    Filed: 8/20/2019  7:55 AM Date of Service: 8/20/2019  7:35 AM Status: Addendum    : Saud Nicole MD (Physician)    Related Notes: Original Note by Saud Nicole MD (Physician) filed at 8/20/2019  7:39 AM       Anesthesia Evaluation      Patient summary reviewed   No history of anesthetic complications     Airway   Mallampati: II  Neck ROM: full   Pulmonary - negative ROS and normal exam                          Cardiovascular - normal exam  Exercise tolerance: > or = 4 METS  (+) hypertension, dysrhythmias, , hypercholesterolemia,      Neuro/Psych - negative ROS     Endo/Other    (+) diabetes mellitus type 2 well controlled,      GI/Hepatic/Renal    (+) esophageal disease,    (-) GERD     Other findings: Gilbert's disease, s/p resection benign esophageal mass, s/p wide resection of scalp Merkel cell Ca, BPH, dry eyes.  H/o AV gregoria reentrant tachycardia, on B-blocker, no episodes for past year. Hg 15.3, K 3.9, GFR>60, A1c 5.8.      Dental    (+) poor dentition                           Anesthesia Plan  Planned anesthetic: general LMA    ASA 2   Induction: intravenous   Anesthetic plan and risks discussed with: patient  Anesthesia plan special considerations: antiemetics,   Post-op plan: routine recovery

## 2021-07-15 ENCOUNTER — HOSPITAL ENCOUNTER (EMERGENCY)
Facility: CLINIC | Age: 76
Discharge: HOME | End: 2021-07-15
Attending: EMERGENCY MEDICINE | Admitting: EMERGENCY MEDICINE
Payer: COMMERCIAL

## 2021-07-15 VITALS
SYSTOLIC BLOOD PRESSURE: 104 MMHG | BODY MASS INDEX: 24.39 KG/M2 | DIASTOLIC BLOOD PRESSURE: 80 MMHG | WEIGHT: 170 LBS | RESPIRATION RATE: 29 BRPM | HEART RATE: 62 BPM | OXYGEN SATURATION: 98 % | TEMPERATURE: 98.2 F

## 2021-07-15 DIAGNOSIS — I47.10 PAROXYSMAL SUPRAVENTRICULAR TACHYCARDIA (H): ICD-10-CM

## 2021-07-15 LAB
ANION GAP SERPL CALCULATED.3IONS-SCNC: 10 MMOL/L (ref 3–14)
BASOPHILS # BLD AUTO: 0.1 10E3/UL (ref 0–0.2)
BASOPHILS NFR BLD AUTO: 1 %
BUN SERPL-MCNC: 15 MG/DL (ref 7–30)
CALCIUM SERPL-MCNC: 9.1 MG/DL (ref 8.5–10.1)
CHLORIDE BLD-SCNC: 108 MMOL/L (ref 94–109)
CO2 SERPL-SCNC: 24 MMOL/L (ref 20–32)
CREAT SERPL-MCNC: 1.04 MG/DL (ref 0.66–1.25)
EOSINOPHIL # BLD AUTO: 0.2 10E3/UL (ref 0–0.7)
EOSINOPHIL NFR BLD AUTO: 2 %
ERYTHROCYTE [DISTWIDTH] IN BLOOD BY AUTOMATED COUNT: 12.8 % (ref 10–15)
GFR SERPL CREATININE-BSD FRML MDRD: 69 ML/MIN/1.73M2
GLUCOSE BLD-MCNC: 196 MG/DL (ref 70–99)
HCT VFR BLD AUTO: 46.9 % (ref 40–53)
HGB BLD-MCNC: 15.8 G/DL (ref 13.3–17.7)
HOLD SPECIMEN: NORMAL
IMM GRANULOCYTES # BLD: 0.1 10E3/UL
IMM GRANULOCYTES NFR BLD: 1 %
LYMPHOCYTES # BLD AUTO: 1.9 10E3/UL (ref 0.8–5.3)
LYMPHOCYTES NFR BLD AUTO: 18 %
MCH RBC QN AUTO: 31 PG (ref 26.5–33)
MCHC RBC AUTO-ENTMCNC: 33.7 G/DL (ref 31.5–36.5)
MCV RBC AUTO: 92 FL (ref 78–100)
MONOCYTES # BLD AUTO: 0.7 10E3/UL (ref 0–1.3)
MONOCYTES NFR BLD AUTO: 7 %
NEUTROPHILS # BLD AUTO: 7.4 10E3/UL (ref 1.6–8.3)
NEUTROPHILS NFR BLD AUTO: 71 %
NRBC # BLD AUTO: 0 10E3/UL
NRBC BLD AUTO-RTO: 0 /100
PLATELET # BLD AUTO: 220 10E3/UL (ref 150–450)
POTASSIUM BLD-SCNC: 3.6 MMOL/L (ref 3.4–5.3)
RBC # BLD AUTO: 5.1 10E6/UL (ref 4.4–5.9)
SODIUM SERPL-SCNC: 142 MMOL/L (ref 133–144)
TROPONIN I SERPL-MCNC: <0.015 UG/L (ref 0–0.04)
WBC # BLD AUTO: 10.3 10E3/UL (ref 4–11)

## 2021-07-15 PROCEDURE — 93010 ELECTROCARDIOGRAM REPORT: CPT | Mod: 76 | Performed by: EMERGENCY MEDICINE

## 2021-07-15 PROCEDURE — 84484 ASSAY OF TROPONIN QUANT: CPT | Performed by: EMERGENCY MEDICINE

## 2021-07-15 PROCEDURE — 80048 BASIC METABOLIC PNL TOTAL CA: CPT | Performed by: EMERGENCY MEDICINE

## 2021-07-15 PROCEDURE — 250N000011 HC RX IP 250 OP 636: Performed by: EMERGENCY MEDICINE

## 2021-07-15 PROCEDURE — 250N000011 HC RX IP 250 OP 636

## 2021-07-15 PROCEDURE — 93005 ELECTROCARDIOGRAM TRACING: CPT | Mod: 76 | Performed by: EMERGENCY MEDICINE

## 2021-07-15 PROCEDURE — 96374 THER/PROPH/DIAG INJ IV PUSH: CPT | Performed by: EMERGENCY MEDICINE

## 2021-07-15 PROCEDURE — 85025 COMPLETE CBC W/AUTO DIFF WBC: CPT | Performed by: EMERGENCY MEDICINE

## 2021-07-15 PROCEDURE — 99291 CRITICAL CARE FIRST HOUR: CPT | Mod: 25 | Performed by: EMERGENCY MEDICINE

## 2021-07-15 PROCEDURE — 93010 ELECTROCARDIOGRAM REPORT: CPT | Performed by: EMERGENCY MEDICINE

## 2021-07-15 PROCEDURE — 93005 ELECTROCARDIOGRAM TRACING: CPT | Performed by: EMERGENCY MEDICINE

## 2021-07-15 PROCEDURE — 36592 COLLECT BLOOD FROM PICC: CPT | Performed by: EMERGENCY MEDICINE

## 2021-07-15 PROCEDURE — 36415 COLL VENOUS BLD VENIPUNCTURE: CPT | Performed by: EMERGENCY MEDICINE

## 2021-07-15 RX ORDER — ADENOSINE 3 MG/ML
6 INJECTION, SOLUTION INTRAVENOUS ONCE
Status: COMPLETED | OUTPATIENT
Start: 2021-07-15 | End: 2021-07-15

## 2021-07-15 RX ORDER — ADENOSINE 3 MG/ML
12 INJECTION, SOLUTION INTRAVENOUS ONCE
Status: COMPLETED | OUTPATIENT
Start: 2021-07-15 | End: 2021-07-15

## 2021-07-15 RX ORDER — ADENOSINE 3 MG/ML
INJECTION, SOLUTION INTRAVENOUS
Status: COMPLETED
Start: 2021-07-15 | End: 2021-07-15

## 2021-07-15 RX ADMIN — ADENOSINE 12 MG: 3 INJECTION, SOLUTION INTRAVENOUS at 14:10

## 2021-07-15 RX ADMIN — ADENOSINE 6 MG: 3 INJECTION, SOLUTION INTRAVENOUS at 14:09

## 2021-07-15 ASSESSMENT — ENCOUNTER SYMPTOMS
COUGH: 0
SORE THROAT: 0
NAUSEA: 0
VOMITING: 0
MYALGIAS: 0
DYSURIA: 0
LIGHT-HEADEDNESS: 0
CHILLS: 0
COLOR CHANGE: 0
PALPITATIONS: 0
SHORTNESS OF BREATH: 0
FEVER: 0
HEADACHES: 0

## 2021-07-15 NOTE — ED PROVIDER NOTES
"  History     Chief Complaint   Patient presents with     Tachycardia     HPI  Kd Hernandez is a 76 year old male with history of PSVT who could feel his heart beat rapidly on describing \"fullness\" in his chest.  He did his usual vagal maneuvers with resolution of symptoms.  Shortly after lunch he had a return of his symptoms.  His vagal maneuvers did not work this time.  Symptoms are consistent with all prior episodes of PSVT.  No shortness of breath, fevers, sore throat, dyspnea.  Denies chest pain but describes as \"fullness\".  Patient is on Tenormin, low-dose aspirin, Metformin, and simvastatin.  No palpitations.      The patient's PMHx, Surgical Hx, Allergies, and Medications were all reviewed with the patient.    Allergies:  Allergies   Allergen Reactions     Sudafed [Pseudoephedrine Hcl]      jittery,insomnia     Sudafed [Pseudoephedrine Hcl]      Made \"jittery\".  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    Past Surgical History:    Past Surgical History:   Procedure Laterality Date      TRANSURETHRAL ELEC-SURG PROSTATECTOM N/A 8/20/2019    Procedure: BIPOLAR TRANSURETHRAL RESECTION OF PROSTATE;  Surgeon: Sanchez Ferro MD;  Location: [facility] [place];  Service: Urology     HERNIA REPAIR       HERNIORRHAPHY UMBILICAL  12/6/2011    Procedure:HERNIORRHAPHY UMBILICAL; Umbilical Hernia Repair with Mesh; " Surgeon:JESUS ALBERTO RODRÍGUEZ; Location:[place] OR     TONSILLECTOMY         Family History:    No family history on file.    Social History:  Marital Status:   [2]  Social History     Tobacco Use     Smoking status: Never Smoker     Smokeless tobacco: Never Used   Substance Use Topics     Alcohol use: Yes     Comment: Alcoholic Drinks/day: Rarely     Drug use: Not Currently        Medications:    alum hydroxide-mag carbonate (GAVISCON)  MG/15ML SUSP  aspirin 81 MG tablet  atenolol (TENORMIN) 25 MG tablet  lorazepam (ATIVAN) 0.5 MG tablet  MetFORmin (GLUCOPHAGE) 500 MG tablet  simvastatin (ZOCOR) 10 MG tablet  tamsulosin (FLOMAX) 0.4 MG 24 hr capsule          Review of Systems   Constitutional: Negative for chills and fever.   HENT: Negative for congestion and sore throat.    Eyes: Negative for visual disturbance.   Respiratory: Negative for cough and shortness of breath.    Cardiovascular: Negative for chest pain and palpitations.   Gastrointestinal: Negative for nausea and vomiting.   Genitourinary: Negative for dysuria.   Musculoskeletal: Negative for myalgias.   Skin: Negative for color change and rash.   Neurological: Negative for light-headedness and headaches.       Physical Exam   BP: 99/66  Pulse: (!) 134  Temp: 98.2  F (36.8  C)  Resp: 19  Weight: 77.1 kg (170 lb)  SpO2: 99 %    Physical Exam  GEN: Awake, alert, and cooperative.  Appears anxious but nontoxic.  HENT: MMM. External ears and nose normal bilaterally.  EYES: EOM intact. Conjunctiva clear. No discharge.   NECK: Symmetric, freely mobile.   CV : Tachycardic rate and regular rhythm.  Brisk capillary refill.  PULM: Normal effort. No wheezes, rales, or rhonchi bilaterally.  Speaking full sentences  ABD: Soft, non-tender, non-distended. No rebound or guarding.   NEURO: Normal speech. Following commands. CN II-XII grossly intact. Answering questions and interacting appropriately.   EXT: No gross deformity.  No pedal or pretibial edema  INT: Warm.  No diaphoresis. Normal color.        ED Course        Procedures       ECG #1.  Reviewed at 1405.  Interpreted by myself.  Narrow complex tachycardia with rate of 162.  No P waves present.  Lateral ST depressions.  No ectopy.    ECG #2.  Reviewed at 1411.  Interpreted by myself.  Sinus rhythm with rate of 71.  Improvement but not resolution of lateral ST depressions.  No complex.  No ectopy.     Critical Care time:  was 35 minutes for this patient excluding procedures.               Results for orders placed or performed during the hospital encounter of 07/15/21 (from the past 24 hour(s))   [place] Draw    Narrative    The following orders were created for panel order [place] Draw.  Procedure                               Abnormality         Status                     ---------                               -----------         ------                     Extra Blue Top Tube[[id number]]                              Final result               Extra Red Top Tube[[id number]]                               Final result               Extra Green Top (Lithium...[[id number]]                      Final result               Extra Green Top (Lithium...[[id number]]                      Final result               Extra Purple Top Tube[[id number]]                            Final result                 Please view results for these tests on the individual orders.   Extra Blue Top Tube   Result Value Ref Range    Hold Specimen JIC    Extra Red Top Tube   Result Value Ref Range    Hold Specimen JIC    Extra Green Top (Lithium Heparin) Tube   Result Value Ref Range    Hold Specimen JIC    Extra Green Top (Lithium Heparin) Tube   Result Value Ref Range    Hold Specimen JIC    Extra Purple Top Tube   Result Value Ref Range    Hold Specimen JIC    CBC with platelets differential    Narrative    The following orders were created for panel order CBC with platelets differential.  Procedure                               Abnormality         Status                      ---------                               -----------         ------                     CBC with platelets and d...[[id number]]  Abnormal            Final result                 Please view results for these tests on the individual orders.   Basic metabolic panel   Result Value Ref Range    Sodium 142 133 - 144 mmol/L    Potassium 3.6 3.4 - 5.3 mmol/L    Chloride 108 94 - 109 mmol/L    Carbon Dioxide (CO2) 24 20 - 32 mmol/L    Anion Gap 10 3 - 14 mmol/L    Urea Nitrogen 15 7 - 30 mg/dL    Creatinine 1.04 0.66 - 1.25 mg/dL    Calcium 9.1 8.5 - 10.1 mg/dL    Glucose 196 (H) 70 - 99 mg/dL    GFR Estimate 69 >60 mL/min/1.73m2   Troponin I   Result Value Ref Range    Troponin I <0.015 0.000 - 0.045 ug/L   CBC with platelets and differential   Result Value Ref Range    WBC Count 10.3 4.0 - 11.0 10e3/uL    RBC Count 5.10 4.40 - 5.90 10e6/uL    Hemoglobin 15.8 13.3 - 17.7 g/dL    Hematocrit 46.9 40.0 - 53.0 %    MCV 92 78 - 100 fL    MCH 31.0 26.5 - 33.0 pg    MCHC 33.7 31.5 - 36.5 g/dL    RDW 12.8 10.0 - 15.0 %    Platelet Count 220 150 - 450 10e3/uL    % Neutrophils 71 %    % Lymphocytes 18 %    % Monocytes 7 %    % Eosinophils 2 %    % Basophils 1 %    % Immature Granulocytes 1 %    NRBCs per 100 WBC 0 <1 /100    Absolute Neutrophils 7.4 1.6 - 8.3 10e3/uL    Absolute Lymphocytes 1.9 0.8 - 5.3 10e3/uL    Absolute Monocytes 0.7 0.0 - 1.3 10e3/uL    Absolute Eosinophils 0.2 0.0 - 0.7 10e3/uL    Absolute Basophils 0.1 0.0 - 0.2 10e3/uL    Absolute Immature Granulocytes 0.1 (H) <=0.0 10e3/uL    Absolute NRBCs 0.0 10e3/uL       Medications   adenosine (ADENOCARD) injection 6 mg (6 mg Intravenous Given 7/15/21 1409)   adenosine (ADENOCARD) injection 12 mg (12 mg Intravenous Given 7/15/21 1410)       Assessments & Plan (with Medical Decision Making)   76 year old male with past medical history significant for PSVT with tachycardia cardia which initially resolved to ankle maneuvers, recurred and then did not  respond.  ECG with no complex tachycardia cardia lateral ST depressions and reciprocal elevation in aVR.  ECG consistent with supraventricular tachycardia.  Patient was given 6 mg of IV adenosine without effect.  Patient was then given 12 mg adenosine and had conversion to sinus rhythm.  Repeat ECG with rate of 71.  Significant improvement of lateral ST depression but not complete resolution.  Patient sensation of fullness in his chest had completely resolved.  He was feeling at his baseline.  Cardiac troponin below detection.  CBC and BMP grossly normal.  Patient was observed in the emergency department for 2 and half hours after and remained symptom free.  Patient asking to be discharged home.  Given his resolution of symptoms and reassuring work-up I feel this is appropriate.  Advised patient to contact his primary care provider tomorrow morning to schedule follow-up care.  They can discuss whether or not he should see his cardiologist.  He reports that his cardiologist says that there is no way to predict when this happens.  Patient can discuss ablation with his cardiologist.     I have reviewed the nursing notes.         Discharge Medication List as of 7/15/2021  5:00 PM          Final diagnoses:   Paroxysmal supraventricular tachycardia (H)     Kalin Beavers MD    7/15/2021   [facility] EMERGENCY DEPT    Disclaimer: This note consists of words and symbols derived from keyboarding and dictation using voice recognition software.  As a result, there may be errors that have gone undetected.  Please consider this when interpreting information found in this note.             Kalin Beavers MD  07/15/21 2783

## 2021-07-15 NOTE — DISCHARGE INSTRUCTIONS
You were treated for your PSVT with adenosine in the emergency department with return to normal sinus rhythm.  Contact your primary care provider tomorrow morning to discuss follow-up appointment.  If you develop chest pain, difficulty breathing, new symptoms you find concerning, or return of your SVT that does not respond to your vagal maneuvers, return to the emergency department for further evaluation and treatment.

## 2022-03-13 ENCOUNTER — TELEPHONE (OUTPATIENT)
Dept: EMERGENCY MEDICINE | Facility: CLINIC | Age: 77
End: 2022-03-13

## 2022-03-13 ENCOUNTER — HOSPITAL ENCOUNTER (EMERGENCY)
Facility: CLINIC | Age: 77
Discharge: HOME | End: 2022-03-13
Attending: FAMILY MEDICINE | Admitting: FAMILY MEDICINE
Payer: COMMERCIAL

## 2022-03-13 VITALS
DIASTOLIC BLOOD PRESSURE: 87 MMHG | WEIGHT: 174 LBS | BODY MASS INDEX: 24.91 KG/M2 | HEIGHT: 70 IN | RESPIRATION RATE: 10 BRPM | HEART RATE: 63 BPM | OXYGEN SATURATION: 99 % | SYSTOLIC BLOOD PRESSURE: 132 MMHG

## 2022-03-13 DIAGNOSIS — I47.10 SVT (SUPRAVENTRICULAR TACHYCARDIA) (H): ICD-10-CM

## 2022-03-13 LAB
ANION GAP SERPL CALCULATED.3IONS-SCNC: 6 MMOL/L (ref 3–14)
BASOPHILS # BLD AUTO: 0.1 10E3/UL (ref 0–0.2)
BASOPHILS NFR BLD AUTO: 1 %
BUN SERPL-MCNC: 18 MG/DL (ref 7–30)
CALCIUM SERPL-MCNC: 8.9 MG/DL (ref 8.5–10.1)
CHLORIDE BLD-SCNC: 111 MMOL/L (ref 94–109)
CO2 SERPL-SCNC: 25 MMOL/L (ref 20–32)
CREAT SERPL-MCNC: 1.15 MG/DL (ref 0.66–1.25)
EOSINOPHIL # BLD AUTO: 0.2 10E3/UL (ref 0–0.7)
EOSINOPHIL NFR BLD AUTO: 2 %
ERYTHROCYTE [DISTWIDTH] IN BLOOD BY AUTOMATED COUNT: 12.9 % (ref 10–15)
GFR SERPL CREATININE-BSD FRML MDRD: 65 ML/MIN/1.73M2
GLUCOSE BLD-MCNC: 188 MG/DL (ref 70–99)
HCT VFR BLD AUTO: 48.3 % (ref 40–53)
HGB BLD-MCNC: 16.3 G/DL (ref 13.3–17.7)
HOLD SPECIMEN: NORMAL
HOLD SPECIMEN: NORMAL
IMM GRANULOCYTES # BLD: 0.1 10E3/UL
IMM GRANULOCYTES NFR BLD: 1 %
LYMPHOCYTES # BLD AUTO: 2 10E3/UL (ref 0.8–5.3)
LYMPHOCYTES NFR BLD AUTO: 19 %
MCH RBC QN AUTO: 30.6 PG (ref 26.5–33)
MCHC RBC AUTO-ENTMCNC: 33.7 G/DL (ref 31.5–36.5)
MCV RBC AUTO: 91 FL (ref 78–100)
MONOCYTES # BLD AUTO: 0.7 10E3/UL (ref 0–1.3)
MONOCYTES NFR BLD AUTO: 7 %
NEUTROPHILS # BLD AUTO: 7.5 10E3/UL (ref 1.6–8.3)
NEUTROPHILS NFR BLD AUTO: 70 %
NRBC # BLD AUTO: 0 10E3/UL
NRBC BLD AUTO-RTO: 0 /100
NT-PROBNP SERPL-MCNC: 54 PG/ML (ref 0–1800)
PLATELET # BLD AUTO: 227 10E3/UL (ref 150–450)
POTASSIUM BLD-SCNC: 3.5 MMOL/L (ref 3.4–5.3)
RBC # BLD AUTO: 5.32 10E6/UL (ref 4.4–5.9)
SODIUM SERPL-SCNC: 142 MMOL/L (ref 133–144)
T4 FREE SERPL-MCNC: 1.15 NG/DL (ref 0.76–1.46)
TROPONIN I SERPL HS-MCNC: 5 NG/L
TSH SERPL DL<=0.005 MIU/L-ACNC: 9.95 MU/L (ref 0.4–4)
WBC # BLD AUTO: 10.6 10E3/UL (ref 4–11)

## 2022-03-13 PROCEDURE — 93010 ELECTROCARDIOGRAM REPORT: CPT | Mod: 76 | Performed by: FAMILY MEDICINE

## 2022-03-13 PROCEDURE — 99284 EMERGENCY DEPT VISIT MOD MDM: CPT | Mod: 25 | Performed by: FAMILY MEDICINE

## 2022-03-13 PROCEDURE — 83880 ASSAY OF NATRIURETIC PEPTIDE: CPT | Mod: GZ | Performed by: EMERGENCY MEDICINE

## 2022-03-13 PROCEDURE — 93010 ELECTROCARDIOGRAM REPORT: CPT | Performed by: FAMILY MEDICINE

## 2022-03-13 PROCEDURE — 84443 ASSAY THYROID STIM HORMONE: CPT | Performed by: FAMILY MEDICINE

## 2022-03-13 PROCEDURE — 85025 COMPLETE CBC W/AUTO DIFF WBC: CPT | Performed by: EMERGENCY MEDICINE

## 2022-03-13 PROCEDURE — 99284 EMERGENCY DEPT VISIT MOD MDM: CPT | Mod: 25

## 2022-03-13 PROCEDURE — 93005 ELECTROCARDIOGRAM TRACING: CPT

## 2022-03-13 PROCEDURE — 93005 ELECTROCARDIOGRAM TRACING: CPT | Mod: 76

## 2022-03-13 PROCEDURE — 84439 ASSAY OF FREE THYROXINE: CPT | Performed by: FAMILY MEDICINE

## 2022-03-13 PROCEDURE — 82374 ASSAY BLOOD CARBON DIOXIDE: CPT | Performed by: EMERGENCY MEDICINE

## 2022-03-13 PROCEDURE — 84484 ASSAY OF TROPONIN QUANT: CPT | Performed by: EMERGENCY MEDICINE

## 2022-03-13 PROCEDURE — 82310 ASSAY OF CALCIUM: CPT | Performed by: EMERGENCY MEDICINE

## 2022-03-13 PROCEDURE — 36415 COLL VENOUS BLD VENIPUNCTURE: CPT | Performed by: EMERGENCY MEDICINE

## 2022-03-13 ASSESSMENT — ENCOUNTER SYMPTOMS
SORE THROAT: 0
CHILLS: 0
DYSURIA: 0
DIAPHORESIS: 0
PALPITATIONS: 1
HEADACHES: 0
NAUSEA: 0
SHORTNESS OF BREATH: 0
WHEEZING: 0
FREQUENCY: 0
FEVER: 0
BLOOD IN STOOL: 0
DIARRHEA: 0
CONSTIPATION: 0
ABDOMINAL PAIN: 0
LIGHT-HEADEDNESS: 1
SINUS PRESSURE: 0
COUGH: 0
VOMITING: 0

## 2022-03-13 NOTE — TELEPHONE ENCOUNTER
[facility] Emergency Department/Urgent Care Lab result notification:    Reason for call  Notify of lab results, assess symptoms,  review ED providers recommendations (if necessary) and advise per ED lab result f/u protocol.    Lab result  Abnormal Result.  Final TSH with free T4 refelex level is 9.95 and this level is [elevated].  Normal reference range is 0.4-4.00 mu/L  Resulted after [facility] Emergency Dept visit on this date 3/13/2022.  [facility] patients, route result to PCP.   Non [facility] patients, RN to notify patient/parent of result and advise to relay result to their PCP immediately.  [ED lab result f/u RN may want to fax result to PCP].    Left voicemail message requesting a call back to [phone] between 9 a.m. and 5:30 p.m. for patient's ED/UC lab results.      Arturo Del Rio RN  [facility] Result RN  [facility] Emergency Dept Lab Result RN  Ph# [phone]

## 2022-03-13 NOTE — DISCHARGE INSTRUCTIONS
ICD-10-CM    1. SVT (supraventricular tachycardia) (H)  I47.1     this appears to have resolved and is siomilar to the more trhan dozen prior episodes. return for recurrent symptoms.  one additional vagal maneuver we discussed - sitting and  blowing into syringe or similar for 45 to 60 seconds then suddenly lying back with legs elevated can often work.  consider repeat cardiology EP visit.  TSH pending at discharge

## 2022-03-13 NOTE — ED NOTES
Pt felt SVT resolved when getting into bed. Pt reports resolution of chest pain and dizziness. Denies recent illness.

## 2022-03-13 NOTE — TELEPHONE ENCOUNTER
[facility] Emergency Department/Urgent Care Lab result notification     Patient/parent Name  Kd HOLCOMB Assessment (Patient s current Symptoms), include time called.    Feeling well today attended [facility]    Lab result (if applicable):  Abnormal Result.  Final TSH with free T4 refelex level is 9.95 and this level is [elevated].  Normal reference range is 0.4-4.00 mu/L  Resulted after [facility] Emergency Dept visit on this date 3/13/2022.  [facility] patients, route result to PCP.   Non [facility] patients, RN to notify patient/parent of result and advise to relay result to their PCP immediately.  [ED lab result f/u RN may want to fax result to PCP].    RN Recommendations/Instructions per [facility] ED lab result protocol  Patient notified of lab result and treatment recommendations. Faxed results to [facility] PCP and encouraged to follow up        Arturo Del Rio RN  [facility]  Emergency Dept Lab Result RN  Ph# [phone]

## 2022-03-13 NOTE — ED TRIAGE NOTES
Pt reports hx of SVT with adenosine to reset rhythm. Pt reports it began 1.5 hrs ago. Endorses lightheadedness and bilateral arm discomfort.

## 2023-01-09 ENCOUNTER — HOSPITAL ENCOUNTER (EMERGENCY)
Facility: CLINIC | Age: 78
Discharge: HOME | End: 2023-01-09
Attending: FAMILY MEDICINE | Admitting: FAMILY MEDICINE
Payer: COMMERCIAL

## 2023-01-09 VITALS
HEIGHT: 70 IN | HEART RATE: 61 BPM | WEIGHT: 165 LBS | SYSTOLIC BLOOD PRESSURE: 122 MMHG | BODY MASS INDEX: 23.62 KG/M2 | OXYGEN SATURATION: 100 % | RESPIRATION RATE: 16 BRPM | DIASTOLIC BLOOD PRESSURE: 90 MMHG | TEMPERATURE: 95.3 F

## 2023-01-09 DIAGNOSIS — I47.10 PAROXYSMAL SUPRAVENTRICULAR TACHYCARDIA (H): ICD-10-CM

## 2023-01-09 LAB
ANION GAP SERPL CALCULATED.3IONS-SCNC: 9 MMOL/L (ref 7–15)
BASOPHILS # BLD AUTO: 0 10E3/UL (ref 0–0.2)
BASOPHILS NFR BLD AUTO: 0 %
BUN SERPL-MCNC: 15.3 MG/DL (ref 8–23)
CALCIUM SERPL-MCNC: 9.5 MG/DL (ref 8.8–10.2)
CHLORIDE SERPL-SCNC: 104 MMOL/L (ref 98–107)
CREAT SERPL-MCNC: 1.15 MG/DL (ref 0.67–1.17)
DEPRECATED HCO3 PLAS-SCNC: 26 MMOL/L (ref 22–29)
EOSINOPHIL # BLD AUTO: 0.1 10E3/UL (ref 0–0.7)
EOSINOPHIL NFR BLD AUTO: 2 %
ERYTHROCYTE [DISTWIDTH] IN BLOOD BY AUTOMATED COUNT: 13 % (ref 10–15)
GFR SERPL CREATININE-BSD FRML MDRD: 65 ML/MIN/1.73M2
GLUCOSE SERPL-MCNC: 195 MG/DL (ref 70–99)
HCT VFR BLD AUTO: 46.1 % (ref 40–53)
HGB BLD-MCNC: 15.6 G/DL (ref 13.3–17.7)
HOLD SPECIMEN: NORMAL
HOLD SPECIMEN: NORMAL
IMM GRANULOCYTES # BLD: 0 10E3/UL
IMM GRANULOCYTES NFR BLD: 0 %
LYMPHOCYTES # BLD AUTO: 1.6 10E3/UL (ref 0.8–5.3)
LYMPHOCYTES NFR BLD AUTO: 18 %
MCH RBC QN AUTO: 30.8 PG (ref 26.5–33)
MCHC RBC AUTO-ENTMCNC: 33.8 G/DL (ref 31.5–36.5)
MCV RBC AUTO: 91 FL (ref 78–100)
MONOCYTES # BLD AUTO: 0.6 10E3/UL (ref 0–1.3)
MONOCYTES NFR BLD AUTO: 7 %
NEUTROPHILS # BLD AUTO: 6.6 10E3/UL (ref 1.6–8.3)
NEUTROPHILS NFR BLD AUTO: 73 %
NRBC # BLD AUTO: 0 10E3/UL
NRBC BLD AUTO-RTO: 0 /100
PLATELET # BLD AUTO: 206 10E3/UL (ref 150–450)
POTASSIUM SERPL-SCNC: 3.9 MMOL/L (ref 3.4–5.3)
RBC # BLD AUTO: 5.07 10E6/UL (ref 4.4–5.9)
SODIUM SERPL-SCNC: 139 MMOL/L (ref 136–145)
T4 FREE SERPL-MCNC: 1.18 NG/DL (ref 0.9–1.7)
TSH SERPL DL<=0.005 MIU/L-ACNC: 9.51 UIU/ML (ref 0.3–4.2)
WBC # BLD AUTO: 9.1 10E3/UL (ref 4–11)

## 2023-01-09 PROCEDURE — 82374 ASSAY BLOOD CARBON DIOXIDE: CPT | Performed by: FAMILY MEDICINE

## 2023-01-09 PROCEDURE — 93005 ELECTROCARDIOGRAM TRACING: CPT | Mod: 76

## 2023-01-09 PROCEDURE — 99285 EMERGENCY DEPT VISIT HI MDM: CPT

## 2023-01-09 PROCEDURE — 82310 ASSAY OF CALCIUM: CPT | Performed by: FAMILY MEDICINE

## 2023-01-09 PROCEDURE — 93005 ELECTROCARDIOGRAM TRACING: CPT

## 2023-01-09 PROCEDURE — 84443 ASSAY THYROID STIM HORMONE: CPT | Performed by: FAMILY MEDICINE

## 2023-01-09 PROCEDURE — 85025 COMPLETE CBC W/AUTO DIFF WBC: CPT | Performed by: FAMILY MEDICINE

## 2023-01-09 PROCEDURE — 99283 EMERGENCY DEPT VISIT LOW MDM: CPT | Mod: 25 | Performed by: FAMILY MEDICINE

## 2023-01-09 PROCEDURE — 93010 ELECTROCARDIOGRAM REPORT: CPT | Performed by: FAMILY MEDICINE

## 2023-01-09 PROCEDURE — 84439 ASSAY OF FREE THYROXINE: CPT | Performed by: FAMILY MEDICINE

## 2023-01-09 PROCEDURE — 36415 COLL VENOUS BLD VENIPUNCTURE: CPT | Performed by: FAMILY MEDICINE

## 2023-01-09 RX ORDER — METOPROLOL TARTRATE 1 MG/ML
2.5-5 INJECTION, SOLUTION INTRAVENOUS ONCE
Status: DISCONTINUED | OUTPATIENT
Start: 2023-01-09 | End: 2023-01-09 | Stop reason: HOSPADM

## 2023-01-09 ASSESSMENT — ENCOUNTER SYMPTOMS
VOMITING: 0
CHILLS: 0
SORE THROAT: 0
DYSURIA: 0
WHEEZING: 0
COUGH: 0
FEVER: 0
SHORTNESS OF BREATH: 0
DIARRHEA: 0
HEADACHES: 0
NAUSEA: 0
PALPITATIONS: 1
SINUS PRESSURE: 0
FREQUENCY: 0
DIAPHORESIS: 0
CONSTIPATION: 0
ABDOMINAL PAIN: 0
BLOOD IN STOOL: 0

## 2023-01-09 ASSESSMENT — ACTIVITIES OF DAILY LIVING (ADL): ADLS_ACUITY_SCORE: 35

## 2023-01-09 NOTE — ED PROVIDER NOTES
"  History     Chief Complaint   Patient presents with     Palpitations     HPI  Kd Hernandez is a 77 year old male who presents with a history of Merkel cell carcinoma of the face hypertension hyperlipidemia BPH on atenolol lorazepam metformin simvastatin and Flomax.  Arrives here with a sense of palpitations.  These had onset in the last hour and a half.  He has a history of PSVT.  He has no associated chest pain shortness of breath or other associated symptoms.  Felt mildly \"dizzy\".  Systolic blood pressure on arrival 104.  He had no obvious triggering events.  He was putting together beads at the time that he had onset.      Allergies:  Allergies   Allergen Reactions     Sudafed [Pseudoephedrine Hcl]      jittery,insomnia     Sudafed [Pseudoephedrine Hcl]      Made \"jittery\".  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    Past Surgical History:    Past Surgical History:   Procedure Laterality Date      TRANSURETHRAL ELEC-SURG PROSTATECTOM N/A 8/20/2019    Procedure: BIPOLAR TRANSURETHRAL RESECTION OF PROSTATE;  Surgeon: Sanchez Ferro MD;  Location: [facility] [place];  Service: Urology     HERNIA REPAIR       HERNIORRHAPHY UMBILICAL  12/6/2011    Procedure:HERNIORRHAPHY UMBILICAL; Umbilical Hernia Repair with Mesh; Surgeon:JESUS ALBERTO RODRÍGUEZ; Location:[place] OR     TONSILLECTOMY         Family History:    No family " "history on file.    Social History:  Marital Status:   [2]  Social History     Tobacco Use     Smoking status: Never     Smokeless tobacco: Never   Substance Use Topics     Alcohol use: Yes     Comment: Alcoholic Drinks/day: Rarely     Drug use: Not Currently        Medications:    alum hydroxide-mag carbonate (GAVISCON)  MG/15ML SUSP  aspirin 81 MG tablet  atenolol (TENORMIN) 25 MG tablet  lorazepam (ATIVAN) 0.5 MG tablet  MetFORmin (GLUCOPHAGE) 500 MG tablet  simvastatin (ZOCOR) 10 MG tablet  tamsulosin (FLOMAX) 0.4 MG 24 hr capsule          Review of Systems   Constitutional: Negative for chills, diaphoresis and fever.   HENT: Negative for ear pain, sinus pressure and sore throat.    Eyes: Negative for visual disturbance.   Respiratory: Negative for cough, shortness of breath and wheezing.    Cardiovascular: Positive for palpitations. Negative for chest pain.   Gastrointestinal: Negative for abdominal pain, blood in stool, constipation, diarrhea, nausea and vomiting.   Genitourinary: Negative for dysuria, frequency and urgency.   Skin: Negative for rash.   Neurological: Negative for headaches.   All other systems reviewed and are negative.      Physical Exam   BP: 104/73  Pulse: (!) 141  Temp: (!) 95.3  F (35.2  C)  Resp: 18  Height: 177.8 cm (5' 10\")  Weight: 74.8 kg (165 lb)  SpO2: 100 %      Physical Exam  Constitutional:       General: He is in acute distress.      Appearance: He is not diaphoretic.   HENT:      Head: Atraumatic.   Eyes:      Conjunctiva/sclera: Conjunctivae normal.   Cardiovascular:      Rate and Rhythm: Normal rate and regular rhythm.      Heart sounds: No murmur heard.  Pulmonary:      Effort: Pulmonary effort is normal. No respiratory distress.      Breath sounds: Normal breath sounds. No stridor. No wheezing or rhonchi.   Abdominal:      General: Abdomen is flat.   Musculoskeletal:      Cervical back: Neck supple.      Right lower leg: No edema.      Left lower leg: No " edema.   Skin:     Coloration: Skin is not pale.      Findings: No rash.   Neurological:      Mental Status: He is alert.      Motor: No weakness.         ED Course                 Procedures                EKG Interpretation:      Interpreted by Bandar Najera MD  EKG done at 0859 hrs. demonstrates a normal axis.   There is ST depression laterally.  No T wave change.  There is a normal R progression.  No Q waves.  Normal intervals.  This with exception of an absent OK.  Normal conduction.  No ectopy.  impression   demonstrates a what is likely atrial flutter versus supraventricular tachycardia.  I do not see P waves.  There is a strain pattern present.  Heart rate in the 140s.           EKG Interpretation:      Interpreted by Bandar Najera MD    KG done at 0909 hrs. demonstrates a sinus rhythm 61 bpm normal axis.  There is no ST change.  No T wave changes.  Normal R progression and no Q waves.  Normal intervals.  This with exception of a slightly prolonged .  No ectopy.  Normal conduction.  Impression sinus rhythm 61 bpm no significant acute changes    Critical Care time:  none               Results for orders placed or performed during the hospital encounter of 01/09/23 (from the past 24 hour(s))   [place] Draw *Canceled*    Narrative    The following orders were created for panel order [place] Draw.  Procedure                               Abnormality         Status                     ---------                               -----------         ------                       Please view results for these tests on the individual orders.   CBC with Platelets & Differential    Narrative    The following orders were created for panel order CBC with Platelets & Differential.  Procedure                               Abnormality         Status                     ---------                               -----------         ------                     CBC with platelets and d...[[id number]]                      Final  result                 Please view results for these tests on the individual orders.   Basic metabolic panel   Result Value Ref Range    Sodium 139 136 - 145 mmol/L    Potassium 3.9 3.4 - 5.3 mmol/L    Chloride 104 98 - 107 mmol/L    Carbon Dioxide (CO2) 26 22 - 29 mmol/L    Anion Gap 9 7 - 15 mmol/L    Urea Nitrogen 15.3 8.0 - 23.0 mg/dL    Creatinine 1.15 0.67 - 1.17 mg/dL    Calcium 9.5 8.8 - 10.2 mg/dL    Glucose 195 (H) 70 - 99 mg/dL    GFR Estimate 65 >60 mL/min/1.73m2   [place] Draw    Narrative    The following orders were created for panel order [place] Draw.  Procedure                               Abnormality         Status                     ---------                               -----------         ------                     Extra Blue Top Tube[[id number]]                              Final result               Extra Red Top Tube[[id number]]                               Final result               Extra Green Top (Lithium...[[id number]]                                                 Extra Purple Top Tube[[id number]]                                                         Please view results for these tests on the individual orders.   CBC with platelets and differential   Result Value Ref Range    WBC Count 9.1 4.0 - 11.0 10e3/uL    RBC Count 5.07 4.40 - 5.90 10e6/uL    Hemoglobin 15.6 13.3 - 17.7 g/dL    Hematocrit 46.1 40.0 - 53.0 %    MCV 91 78 - 100 fL    MCH 30.8 26.5 - 33.0 pg    MCHC 33.8 31.5 - 36.5 g/dL    RDW 13.0 10.0 - 15.0 %    Platelet Count 206 150 - 450 10e3/uL    % Neutrophils 73 %    % Lymphocytes 18 %    % Monocytes 7 %    % Eosinophils 2 %    % Basophils 0 %    % Immature Granulocytes 0 %    NRBCs per 100 WBC 0 <1 /100    Absolute Neutrophils 6.6 1.6 - 8.3 10e3/uL    Absolute Lymphocytes 1.6 0.8 - 5.3 10e3/uL    Absolute Monocytes 0.6 0.0 - 1.3 10e3/uL    Absolute Eosinophils 0.1 0.0 - 0.7 10e3/uL    Absolute Basophils 0.0 0.0 - 0.2 10e3/uL    Absolute Immature Granulocytes 0.0 <=0.4  10e3/uL    Absolute NRBCs 0.0 10e3/uL   Extra Blue Top Tube   Result Value Ref Range    Hold Specimen JIC    Extra Red Top Tube   Result Value Ref Range    Hold Specimen JIC    TSH with free T4 reflex   Result Value Ref Range    TSH 9.51 (H) 0.30 - 4.20 uIU/mL   T4 free   Result Value Ref Range    Free T4 1.18 0.90 - 1.70 ng/dL       Medications - No data to display    Assessments & Plan (with Medical Decision Making)     MDM: Kd Hernandez is a 77 year old male who presents with supraventricular tachycardia history in the past on chronic atenolol.  He has had multiple episodes in the past and occurs at least once a year if not more often.  He converted to sinus rhythm while he was in the emergency department shortly before I saw him.  His first EKG demonstrated a strain pattern but his second EKG is normal and his systolic blood pressure at the time 122.  He is asymptomatic at this time.  He had no associated chest pain shortness of breath other cardiopulmonary symptoms.  Made recommendations as below regarding following up with his primary provider considering metoprolol instead of atenolol.  Also considering seeing electrophysiology as these have recurred.  I did obtain labs here thyroid testing and chemistry panel.  Some of these results were pending at the time of discharge      I have reviewed the nursing notes.    I have reviewed the findings, diagnosis, plan and need for follow up with the patient.       Medical Decision Making  The patient presented with a problem that is acute and uncomplicated.    The patient's evaluation involved:  ordering and review of 2 test(s) (see separate area of note for details)    The patient's management involved only simple and very low risk treatment.        New Prescriptions    No medications on file       Final diagnoses:   Paroxysmal supraventricular tachycardia (H) - This appears to have been PSVT, but atrial flutter can have similar appearance.  follow-up with primary  provider and consider metoprolol instead of atenolol.  this converted without intervention.  await TSH and electrolytes - still pending at discharge,  return for recurrence. consider electrophysiology consult       1/9/2023   [facility] EMERGENCY DEPT     Bandar Najera MD  01/09/23 0939       Bandar Najera MD  01/09/23 [id number]

## 2023-01-09 NOTE — ED TRIAGE NOTES
Pt states he is in SVT. Denied chest pain.    Triage Assessment     Row Name 01/09/23 0862       Triage Assessment (Adult)    Airway WDL WDL       Respiratory WDL    Respiratory WDL WDL       Skin Circulation/Temperature WDL    Skin Circulation/Temperature WDL WDL       Cardiac WDL    Cardiac WDL X;rhythm    Pulse Rate & Regularity tachycardic       Cognitive/Neuro/Behavioral WDL    Cognitive/Neuro/Behavioral WDL WDL

## 2023-01-09 NOTE — DISCHARGE INSTRUCTIONS
ICD-10-CM    1. Paroxysmal supraventricular tachycardia (H)  I47.1     This appears to have been PSVT, but atrial flutter can have similar appearance.  follow-up with primary provider and consider metoprolol instead of atenolol.  this converted without intervention.  await TSH and electrolytes - still pending at discharge,  return for recurrence. consider electrophysiology consult

## 2023-04-20 NOTE — ED PROVIDER NOTES
"  History     Chief Complaint   Patient presents with     Chest Pain     HPI  Kd Hernandez is a 77 year old male who presents with history of hyperlipidemia hypertension, Merkel cell carcinoma, superficial thrombophlebitis of the right leg on atenolol aspirin lorazepam Metformin simvastatin and Flomax.  Presents with 1-1/2 hours of symptoms of possible SVT immediately prior to arrival had lightheadedness and bilateral arm pain.  Symptoms have since resolved.  Approximate onset would have been around 4 a.m.    He has had multiple episodes of SVT in the past.  He has this at least once a year.  Probably a good 18 times he tells me he has had prior episodes of SVT.  This was similar and he tried several vagal maneuvers at home including trying to bear down on the toilet and putting his face in ice water but nothing seemed to resolve until he arrived here and shortly before obtaining EKG and other testing his symptoms resolved.  He did note bilateral arm pain and palpitations no significant chest pressure no shortness of breath some sense of lightheadedness that resolved.  No near syncope.  He had no associated weakness no neurologic changes.   Prior to its onset that occurred when he was rolling over in bed he has been in his usual state of health.  He has had no recent thyroid testing.  He has seen electrophysiology apparently in the past few years.  Nothing recent.  He has not been interested in undergoing ablation in the past.      He has pending evaluation of a nodular change in the lateral neck on the right side.  CT pending for this.  Allergies:  Allergies   Allergen Reactions     Sudafed [Pseudoephedrine Hcl]      jittery,insomnia     Sudafed [Pseudoephedrine Hcl]      Made \"jittery\".  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    Past Surgical History:    Past Surgical History:   Procedure Laterality Date     HC TRANSURETHRAL ELEC-SURG PROSTATECTOM N/A 8/20/2019    Procedure: BIPOLAR TRANSURETHRAL RESECTION OF PROSTATE;  Surgeon: Sanchez Ferro MD;  Location: [facility] [place];  Service: Urology     HERNIA REPAIR       HERNIORRHAPHY UMBILICAL  12/6/2011    Procedure:HERNIORRHAPHY UMBILICAL; Umbilical Hernia Repair with Mesh; Surgeon:JESUS ALBERTO RODRÍGUEZ; Location:[place] OR     TONSILLECTOMY         Family History:    No family history on file.    Social History:  Marital Status:   [2]  Social History     Tobacco Use     Smoking status: Never Smoker     Smokeless tobacco: Never Used   Substance Use Topics     Alcohol use: Yes     Comment: Alcoholic Drinks/day: Rarely     Drug use: Not Currently        Medications:    alum hydroxide-mag carbonate (GAVISCON)  MG/15ML SUSP  aspirin 81 MG tablet  atenolol (TENORMIN) 25 MG tablet  lorazepam (ATIVAN) 0.5 MG tablet  MetFORmin (GLUCOPHAGE) 500 MG tablet  simvastatin (ZOCOR) 10 MG tablet  tamsulosin (FLOMAX) 0.4 MG 24 hr capsule          Review of Systems   Constitutional: Negative for chills, diaphoresis and fever.   HENT: Negative for ear pain, sinus pressure and sore throat.    Eyes: Negative for visual disturbance.   Respiratory: Negative for cough, shortness of breath and wheezing.    Cardiovascular: Positive for palpitations. Negative for chest pain.   Gastrointestinal: Negative for abdominal pain, blood in stool, constipation, diarrhea, nausea and vomiting.  "  Genitourinary: Negative for dysuria, frequency and urgency.   Skin: Negative for rash.   Neurological: Positive for light-headedness. Negative for headaches.   All other systems reviewed and are negative.      Physical Exam   BP: (!) 146/84  Pulse: 64  Resp: 18  Height: 177.8 cm (5' 10\")  Weight: 78.9 kg (174 lb)  SpO2: 100 %      Physical Exam  Constitutional:       General: He is in acute distress.      Appearance: He is not diaphoretic.   HENT:      Head: Atraumatic.   Eyes:      Conjunctiva/sclera: Conjunctivae normal.   Cardiovascular:      Rate and Rhythm: Normal rate.      Pulses: Normal pulses.      Heart sounds: No murmur heard.  Pulmonary:      Effort: Pulmonary effort is normal. No respiratory distress.      Breath sounds: Normal breath sounds. No stridor. No wheezing or rhonchi.   Abdominal:      General: Abdomen is flat. Bowel sounds are normal. There is no distension.      Palpations: There is no mass.      Tenderness: There is no abdominal tenderness. There is no guarding.   Musculoskeletal:      Cervical back: Neck supple.      Right lower leg: No edema.      Left lower leg: No edema.   Skin:     Coloration: Skin is not pale.      Findings: No rash.   Neurological:      General: No focal deficit present.      Mental Status: He is alert.      Motor: No weakness.         ED Course                 Procedures                EKG Interpretation:      Interpreted by Bandar Najera MD  EKG done at 0555 hrs. demonstrates a sinus rhythm 63 bpm normal axis.  There is a marginal ST depression upsloping in V3 V4 and possibly V5.  There is a normal R progression.  No Q waves.  Normal intervals.  Normal conduction.  No ectopy.  Impression sinus rhythm 63 bpm possible marginal ST depression laterally starting around V3 through V5.  No significant change from EKG done in July 2021.           EKG Interpretation:      Interpreted by Bandar Najera MD  EKG done at 0649 hrs. demonstrates a sinus rhythm 65 bpm with a " normal axis.  No ST change.  No T wave changes.  Normal R progression no Q waves.  Normal intervals.  Normal conduction.  No ectopy.  Impression sinus rhythm at 65 bpm no acute change.  This is improved from the first EKG which was likely done immediately after his SVT resolved.  There was likely a lateral strain pattern related to this on first ekg    Critical Care time:  none               Results for orders placed or performed during the hospital encounter of 03/13/22 (from the past 24 hour(s))   CBC with platelets differential    Narrative    The following orders were created for panel order CBC with platelets differential.  Procedure                               Abnormality         Status                     ---------                               -----------         ------                     CBC with platelets and d...[[id number]]                      Final result                 Please view results for these tests on the individual orders.   Basic metabolic panel   Result Value Ref Range    Sodium 142 133 - 144 mmol/L    Potassium 3.5 3.4 - 5.3 mmol/L    Chloride 111 (H) 94 - 109 mmol/L    Carbon Dioxide (CO2) 25 20 - 32 mmol/L    Anion Gap 6 3 - 14 mmol/L    Urea Nitrogen 18 7 - 30 mg/dL    Creatinine 1.15 0.66 - 1.25 mg/dL    Calcium 8.9 8.5 - 10.1 mg/dL    Glucose 188 (H) 70 - 99 mg/dL    GFR Estimate 65 >60 mL/min/1.73m2   Troponin I   Result Value Ref Range    Troponin I High Sensitivity 5 <79 ng/L   Nt probnp inpatient (BNP)   Result Value Ref Range    N terminal Pro BNP Inpatient 54 0-1,800 pg/mL   CBC with platelets and differential   Result Value Ref Range    WBC Count 10.6 4.0 - 11.0 10e3/uL    RBC Count 5.32 4.40 - 5.90 10e6/uL    Hemoglobin 16.3 13.3 - 17.7 g/dL    Hematocrit 48.3 40.0 - 53.0 %    MCV 91 78 - 100 fL    MCH 30.6 26.5 - 33.0 pg    MCHC 33.7 31.5 - 36.5 g/dL    RDW 12.9 10.0 - 15.0 %    Platelet Count 227 150 - 450 10e3/uL    % Neutrophils 70 %    % Lymphocytes 19 %    % Monocytes 7  %    % Eosinophils 2 %    % Basophils 1 %    % Immature Granulocytes 1 %    NRBCs per 100 WBC 0 <1 /100    Absolute Neutrophils 7.5 1.6 - 8.3 10e3/uL    Absolute Lymphocytes 2.0 0.8 - 5.3 10e3/uL    Absolute Monocytes 0.7 0.0 - 1.3 10e3/uL    Absolute Eosinophils 0.2 0.0 - 0.7 10e3/uL    Absolute Basophils 0.1 0.0 - 0.2 10e3/uL    Absolute Immature Granulocytes 0.1 <=0.4 10e3/uL    Absolute NRBCs 0.0 10e3/uL   [place] Draw    Narrative    The following orders were created for panel order [place] Draw.  Procedure                               Abnormality         Status                     ---------                               -----------         ------                     Extra Blue Top Tube[[id number]]                              In process                 Extra Red Top Tube[[id number]]                               In process                   Please view results for these tests on the individual orders.       Medications - No data to display    Assessments & Plan (with Medical Decision Making)     MDM: Kd Hernandez is a 77 year old male who presents with recurrent episodes of PSVT in the past onset approximately 1/2 hours prior to arrival with some associated palpitations bilateral arm discomfort and lightheadedness resolved spontaneously although tried vagal maneuvers prior to arrival.  Mild ST depression laterally on his arrival we will recheck EKG but this EKG is similar to his prior.  He has had more than a dozen possibly 18 episodes of this in the past he is convinced this was SVT.  He took his pulse during the event is very similar.  We discussed the potential for acute coronary syndrome with bilateral arm symptoms but he notes this is very similar to prior episodes and feels comfortable going home after this initial eval.  We discussed that the troponin itself and other testing can be negative when tested too early but even if this was elevated with SVT he may have been more strain and he is comfortable  with returning if symptoms or signs worsen.  Precautions given for return.    I have reviewed the nursing notes.    I have reviewed the findings, diagnosis, plan and need for follow up with the patient.       New Prescriptions    No medications on file       Final diagnoses:   SVT (supraventricular tachycardia) (H) - this appears to have resolved and is siomilar to the more trhan dozen prior episodes. return for recurrent symptoms.  one additional vagal maneuver we discussed - sitting and  blowing into syringe or similar for 45 to 60 seconds then suddenly lying back with legs elevated can often work.  consider repeat cardiology EP visit.  TSH pending at discharge       3/13/2022   [facility] EMERGENCY DEPT    Bandar Najera MD  03/13/22 0697   General

## 2024-11-03 ENCOUNTER — HOSPITAL ENCOUNTER (EMERGENCY)
Facility: CLINIC | Age: 79
Discharge: HOME | End: 2024-11-03
Attending: FAMILY MEDICINE | Admitting: FAMILY MEDICINE
Payer: COMMERCIAL

## 2024-11-03 VITALS
OXYGEN SATURATION: 100 % | BODY MASS INDEX: 24.91 KG/M2 | DIASTOLIC BLOOD PRESSURE: 84 MMHG | SYSTOLIC BLOOD PRESSURE: 103 MMHG | TEMPERATURE: 96 F | HEART RATE: 61 BPM | WEIGHT: 174 LBS | HEIGHT: 70 IN | RESPIRATION RATE: 11 BRPM

## 2024-11-03 DIAGNOSIS — I47.10 SVT (SUPRAVENTRICULAR TACHYCARDIA) (H): ICD-10-CM

## 2024-11-03 LAB
ANION GAP SERPL CALCULATED.3IONS-SCNC: 12 MMOL/L (ref 7–15)
BASOPHILS # BLD AUTO: 0.1 10E3/UL (ref 0–0.2)
BASOPHILS NFR BLD AUTO: 1 %
BUN SERPL-MCNC: 19.5 MG/DL (ref 8–23)
CALCIUM SERPL-MCNC: 9.6 MG/DL (ref 8.8–10.4)
CHLORIDE SERPL-SCNC: 104 MMOL/L (ref 98–107)
CREAT SERPL-MCNC: 1.17 MG/DL (ref 0.67–1.17)
EGFRCR SERPLBLD CKD-EPI 2021: 63 ML/MIN/1.73M2
EOSINOPHIL # BLD AUTO: 0.2 10E3/UL (ref 0–0.7)
EOSINOPHIL NFR BLD AUTO: 2 %
ERYTHROCYTE [DISTWIDTH] IN BLOOD BY AUTOMATED COUNT: 12.5 % (ref 10–15)
GLUCOSE SERPL-MCNC: 177 MG/DL (ref 70–99)
HCO3 SERPL-SCNC: 26 MMOL/L (ref 22–29)
HCT VFR BLD AUTO: 45.1 % (ref 40–53)
HGB BLD-MCNC: 15.5 G/DL (ref 13.3–17.7)
HOLD SPECIMEN: NORMAL
IMM GRANULOCYTES # BLD: 0.1 10E3/UL
IMM GRANULOCYTES NFR BLD: 1 %
LYMPHOCYTES # BLD AUTO: 2 10E3/UL (ref 0.8–5.3)
LYMPHOCYTES NFR BLD AUTO: 22 %
MCH RBC QN AUTO: 31.4 PG (ref 26.5–33)
MCHC RBC AUTO-ENTMCNC: 34.4 G/DL (ref 31.5–36.5)
MCV RBC AUTO: 92 FL (ref 78–100)
MONOCYTES # BLD AUTO: 0.7 10E3/UL (ref 0–1.3)
MONOCYTES NFR BLD AUTO: 8 %
NEUTROPHILS # BLD AUTO: 6.2 10E3/UL (ref 1.6–8.3)
NEUTROPHILS NFR BLD AUTO: 67 %
NRBC # BLD AUTO: 0 10E3/UL
NRBC BLD AUTO-RTO: 0 /100
PLATELET # BLD AUTO: 207 10E3/UL (ref 150–450)
POTASSIUM SERPL-SCNC: 3.6 MMOL/L (ref 3.4–5.3)
RBC # BLD AUTO: 4.93 10E6/UL (ref 4.4–5.9)
SODIUM SERPL-SCNC: 142 MMOL/L (ref 135–145)
WBC # BLD AUTO: 9.2 10E3/UL (ref 4–11)

## 2024-11-03 PROCEDURE — 93005 ELECTROCARDIOGRAM TRACING: CPT | Mod: 76 | Performed by: FAMILY MEDICINE

## 2024-11-03 PROCEDURE — 93010 ELECTROCARDIOGRAM REPORT: CPT | Mod: 76 | Performed by: FAMILY MEDICINE

## 2024-11-03 PROCEDURE — 99284 EMERGENCY DEPT VISIT MOD MDM: CPT | Performed by: FAMILY MEDICINE

## 2024-11-03 PROCEDURE — 93010 ELECTROCARDIOGRAM REPORT: CPT | Performed by: FAMILY MEDICINE

## 2024-11-03 PROCEDURE — 99284 EMERGENCY DEPT VISIT MOD MDM: CPT | Mod: 25 | Performed by: FAMILY MEDICINE

## 2024-11-03 PROCEDURE — 82947 ASSAY GLUCOSE BLOOD QUANT: CPT | Performed by: FAMILY MEDICINE

## 2024-11-03 PROCEDURE — 250N000011 HC RX IP 250 OP 636: Performed by: FAMILY MEDICINE

## 2024-11-03 PROCEDURE — 80048 BASIC METABOLIC PNL TOTAL CA: CPT | Performed by: FAMILY MEDICINE

## 2024-11-03 PROCEDURE — 85004 AUTOMATED DIFF WBC COUNT: CPT | Performed by: FAMILY MEDICINE

## 2024-11-03 PROCEDURE — 96374 THER/PROPH/DIAG INJ IV PUSH: CPT | Performed by: FAMILY MEDICINE

## 2024-11-03 PROCEDURE — 93005 ELECTROCARDIOGRAM TRACING: CPT | Performed by: FAMILY MEDICINE

## 2024-11-03 PROCEDURE — 36415 COLL VENOUS BLD VENIPUNCTURE: CPT | Performed by: FAMILY MEDICINE

## 2024-11-03 RX ORDER — ADENOSINE 3 MG/ML
12 INJECTION, SOLUTION INTRAVENOUS EVERY 5 MIN PRN
Status: DISCONTINUED | OUTPATIENT
Start: 2024-11-03 | End: 2024-11-03 | Stop reason: HOSPADM

## 2024-11-03 RX ADMIN — ADENOSINE 12 MG: 3 INJECTION, SOLUTION INTRAVENOUS at 10:15

## 2024-11-03 ASSESSMENT — ACTIVITIES OF DAILY LIVING (ADL)
ADLS_ACUITY_SCORE: 0
ADLS_ACUITY_SCORE: 0

## 2024-11-03 ASSESSMENT — COLUMBIA-SUICIDE SEVERITY RATING SCALE - C-SSRS
1. IN THE PAST MONTH, HAVE YOU WISHED YOU WERE DEAD OR WISHED YOU COULD GO TO SLEEP AND NOT WAKE UP?: NO
6. HAVE YOU EVER DONE ANYTHING, STARTED TO DO ANYTHING, OR PREPARED TO DO ANYTHING TO END YOUR LIFE?: NO
2. HAVE YOU ACTUALLY HAD ANY THOUGHTS OF KILLING YOURSELF IN THE PAST MONTH?: NO

## 2024-11-03 NOTE — ED PROVIDER NOTES
"  HPI   Patient is an 79-year-old male presenting with palpitations.  Per my chart review, the patient has a known history of paroxysmal supraventricular tachycardia.  He was last seen for this in January, 2023.  He had spontaneous cardioversion.  There was some concern for PSVT versus atrial fibrillation at that time, see note for details.  There was lateral ST depression described.  Prior to that episode, the patient was seen for another episode of spontaneous conversion of narrow complex tachycardia.  Prior to that episode, the patient was seen for similar and had adenosine 6 mg and then adenosine 12 mg with successful chemical cardioversion.  He has been switched to metoprolol and he is taking this as directed on a daily basis.  He currently takes Toprol XL 25 mg daily.  He takes losartan as well.  He takes aspirin 81 mg daily.    The patient had onset of palpitations with chest tightness and lightheadedness beginning at about 8:15 AM this morning.  This has felt very similar to prior episodes.  No chest pain.  No fainting.  No shortness of breath.  No new activities or overuse.  No falls or injuries.  No recent fever or obvious illness.  No new leg pain or swelling.      Allergies:  Allergies   Allergen Reactions    Sudafed [Pseudoephedrine Hcl]      jittery,insomnia    Sudafed [Pseudoephedrine Hcl]      Made \"jittery\".  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  Past Surgical History:    Past Surgical History:   Procedure Laterality Date    HC TRANSURETHRAL ELEC-SURG PROSTATECTOM N/A 8/20/2019    Procedure: BIPOLAR TRANSURETHRAL RESECTION OF PROSTATE;  Surgeon: Sanchez Ferro MD;  Location: [facility] - [place];  Service: Urology    HERNIA REPAIR      HERNIORRHAPHY UMBILICAL  12/6/2011    Procedure:HERNIORRHAPHY UMBILICAL; Umbilical Hernia Repair with Mesh; Surgeon:JESUS ALBERTO RODRÍGUEZ; Location:[place] OR    TONSILLECTOMY       Family History:    No family history on file.  Social History:  Marital Status:   [2]  Social History     Tobacco Use    Smoking status: Never    Smokeless tobacco: Never   Substance Use Topics    Alcohol use: Yes     Comment: Alcoholic Drinks/day: Rarely    Drug use: Not Currently      Medications:    alum hydroxide-mag carbonate (GAVISCON)  MG/15ML SUSP  aspirin 81 MG tablet  atenolol (TENORMIN) 25 MG tablet  lorazepam (ATIVAN) 0.5 MG tablet  MetFORmin (GLUCOPHAGE) 500 MG tablet  simvastatin (ZOCOR) 10 MG tablet  tamsulosin (FLOMAX) 0.4 MG 24 hr capsule      Review of Systems   All other systems reviewed and are negative.      PE   BP: 93/71  Pulse: (!) 149  Temp: (!) 96  F (35.6  C) (applied warm blakets)  Resp: 18  Height: 177.8 cm (5' 10\")  Weight: 78.9 kg (174 lb)  SpO2: 99 %  Physical Exam  Vitals and nursing note reviewed.   Constitutional:       General: He is not in acute distress.  HENT:      Head: Atraumatic.      Right Ear: External ear normal.      Left Ear: External ear normal.      Nose: Nose normal.      Mouth/Throat:      Mouth: Mucous membranes are moist.      Pharynx: Oropharynx is clear.   Eyes:      General: No scleral icterus.     Extraocular Movements: Extraocular movements intact.      Conjunctiva/sclera: Conjunctivae normal.      Pupils: Pupils are equal, round, and reactive to light.   Cardiovascular: "      Rate and Rhythm: Regular rhythm. Tachycardia present.   Pulmonary:      Effort: Pulmonary effort is normal. No respiratory distress.   Abdominal:      Palpations: Abdomen is soft.      Tenderness: There is no abdominal tenderness.   Musculoskeletal:         General: Normal range of motion.      Cervical back: Normal range of motion.   Skin:     General: Skin is warm and dry.   Neurological:      Mental Status: He is alert and oriented to person, place, and time.   Psychiatric:         Behavior: Behavior normal.         ED COURSE and MDM   [id number].  Patient with recurrent palpitations with narrow complex tachycardia.  This appears regular on the monitor.  There is subtle variation on the EKG, see below.  His rate ranges anywhere from about 150 down to about 141 currently.  EKG shows 145.    1019.  Adenosine 12 mg IV was given and there was successful chemical cardioversion.  Lab values pending.  Patient still doing quite well.  Asymptomatic.  He tolerated the procedure without complication.  SVT is the dx.    EKG  (1000)   Interpretation performed by me.  Rate: 145     Rhythm: svt vs atrial flutter/fib     Axis: nl  Intervals: NJ (12-2) -, QRS (<12) 94, QTc (>5) 444  P wave: -     QRS complex: nl   ST segment / T-wave: ST depression diffusely, more prominent in the lateral leads.  Conclusion: Tachycardia, SVT versus atrial flutter/fib.  Subendocardial injury suggested by ST depression.    EKG  (1024)   Interpretation performed by me.  Rate: 58     Rhythm: ns     Axis: nl  Intervals: NJ (12-2) 210, QRS (<12) 92, QTc (>5) 379  P wave: nl     QRS complex: nl   ST segment / T-wave: nl  Conclusion: nl    Electronic medical chart reviewed, including medical problems, medications, medical allergies, social history.  Recent hospitalizations and surgical procedures reviewed.  Recent clinic visits and consultations reviewed.  Recent labs and test results reviewed.  Nursing notes reviewed.    The patient, their parent if  applicable, and/or their medical decision maker(s) and I have reviewed all of the available historical information, applicable PMH, physical exam findings, and objective diagnostic data gathered during this ED visit.  We then discussed all work-up options and then together agreed upon the course taken during this visit.  The ultimate disposition and plan was a cooperative decision made between myself and the patient, their parent if applicable, and/or their legal decision maker(s).  The risks and benefits of all decisions made during this visit were discussed to the best of my abilities given the circumstances, and all parties are understanding of the pertinent ramifications of these decisions.      LABS  Labs Ordered and Resulted from Time of ED Arrival to Time of ED Departure   BASIC METABOLIC PANEL - Abnormal       Result Value    Sodium 142      Potassium 3.6      Chloride 104      Carbon Dioxide (CO2) 26      Anion Gap 12      Urea Nitrogen 19.5      Creatinine 1.17      GFR Estimate 63      Calcium 9.6      Glucose 177 (*)    CBC WITH PLATELETS AND DIFFERENTIAL    WBC Count 9.2      RBC Count 4.93      Hemoglobin 15.5      Hematocrit 45.1      MCV 92      MCH 31.4      MCHC 34.4      RDW 12.5      Platelet Count 207      % Neutrophils 67      % Lymphocytes 22      % Monocytes 8      % Eosinophils 2      % Basophils 1      % Immature Granulocytes 1      NRBCs per 100 WBC 0      Absolute Neutrophils 6.2      Absolute Lymphocytes 2.0      Absolute Monocytes 0.7      Absolute Eosinophils 0.2      Absolute Basophils 0.1      Absolute Immature Granulocytes 0.1      Absolute NRBCs 0.0         IMAGING  Images reviewed by me.  Radiology report also reviewed.  No orders to display       Procedures    Medications   adenosine (ADENOCARD) injection 12 mg (12 mg Intravenous $Given 11/3/24 1015)         IMPRESSION       ICD-10-CM    1. SVT (supraventricular tachycardia) (H)  I47.10                Medication List      There  are no discharge medications for this visit.                             Jorge A Bardales MD  11/03/24 [id number]

## 2024-11-03 NOTE — DISCHARGE INSTRUCTIONS
RETURN TO THE EMERGENCY ROOM FOR THE FOLLOWING:    Severely worsened breathing, new chest pain, prolonged racing heart, fainting, or at anytime for any concern.    FOLLOW UP:    Consider follow-up with cardiology for recurring symptoms.    TREATMENT RECOMMENDATIONS:    There have been no new medications provided and there are no prescription medication changes recommended.     NURSE ADVICE LINE:  [phone] or [phone]

## 2024-11-03 NOTE — ED TRIAGE NOTES
Pt reports he noticed his heart rate was elevated this morning, pt reports he was getting ready for Religious and felt like his heart was racing, checked his heart rate and it was in the 150s    Pt denies chest pain, sob. Pt reports he can just feel his heart racing, pt reports a hx of SVT in the past     Pt was called & made aware.

## 2024-11-03 NOTE — ED NOTES
Pt given 12mg adenosine for PSVT HR in the 140's. Pt tolerated well, pt converted HR in 60s. EKG ordered, continue to monitor

## 2025-03-08 ENCOUNTER — APPOINTMENT (OUTPATIENT)
Dept: GENERAL RADIOLOGY | Facility: CLINIC | Age: 80
End: 2025-03-08
Attending: EMERGENCY MEDICINE
Payer: COMMERCIAL

## 2025-03-08 ENCOUNTER — HOSPITAL ENCOUNTER (EMERGENCY)
Facility: CLINIC | Age: 80
Discharge: HOME | End: 2025-03-08
Attending: EMERGENCY MEDICINE | Admitting: EMERGENCY MEDICINE
Payer: COMMERCIAL

## 2025-03-08 VITALS
HEART RATE: 67 BPM | WEIGHT: 182 LBS | RESPIRATION RATE: 20 BRPM | OXYGEN SATURATION: 99 % | TEMPERATURE: 98 F | HEIGHT: 70 IN | SYSTOLIC BLOOD PRESSURE: 156 MMHG | BODY MASS INDEX: 26.05 KG/M2 | DIASTOLIC BLOOD PRESSURE: 89 MMHG

## 2025-03-08 DIAGNOSIS — U07.1 COVID-19 VIRUS INFECTION: ICD-10-CM

## 2025-03-08 LAB
FLUAV RNA SPEC QL NAA+PROBE: NEGATIVE
FLUBV RNA RESP QL NAA+PROBE: NEGATIVE
RSV RNA SPEC NAA+PROBE: NEGATIVE
SARS-COV-2 RNA RESP QL NAA+PROBE: POSITIVE

## 2025-03-08 PROCEDURE — 99284 EMERGENCY DEPT VISIT MOD MDM: CPT | Mod: 25 | Performed by: EMERGENCY MEDICINE

## 2025-03-08 PROCEDURE — 87637 SARSCOV2&INF A&B&RSV AMP PRB: CPT | Performed by: EMERGENCY MEDICINE

## 2025-03-08 PROCEDURE — 99284 EMERGENCY DEPT VISIT MOD MDM: CPT | Performed by: EMERGENCY MEDICINE

## 2025-03-08 PROCEDURE — 71046 X-RAY EXAM CHEST 2 VIEWS: CPT

## 2025-03-08 RX ORDER — BENZONATATE 100 MG/1
100 CAPSULE ORAL 3 TIMES DAILY PRN
Qty: 15 CAPSULE | Refills: 0 | Status: SHIPPED | OUTPATIENT
Start: 2025-03-08

## 2025-03-08 ASSESSMENT — COLUMBIA-SUICIDE SEVERITY RATING SCALE - C-SSRS
6. HAVE YOU EVER DONE ANYTHING, STARTED TO DO ANYTHING, OR PREPARED TO DO ANYTHING TO END YOUR LIFE?: NO
1. IN THE PAST MONTH, HAVE YOU WISHED YOU WERE DEAD OR WISHED YOU COULD GO TO SLEEP AND NOT WAKE UP?: NO
2. HAVE YOU ACTUALLY HAD ANY THOUGHTS OF KILLING YOURSELF IN THE PAST MONTH?: NO

## 2025-03-08 ASSESSMENT — ACTIVITIES OF DAILY LIVING (ADL)
ADLS_ACUITY_SCORE: 41

## 2025-03-08 NOTE — DISCHARGE INSTRUCTIONS
Return if symptoms worsen or new symptoms develop.  You have COVID symptoms.  COVID is positive and your chest x-ray is unremarkable.  Take cough medication as directed for your cough and if increased shortness of breath chest pain weakness decreased oxygen saturations or other symptoms present please return for further evaluation and care

## 2025-03-08 NOTE — ED TRIAGE NOTES
Patient reports ~1 week of flu like symptoms that include fatigue, decreased appetite, productive cough with green sputum, nasal drainage and low grade fevers. Reports SOB with walking up stairs. No hx of COPD or emphysema. Patient was unable to get an appointment in clinic.      Triage Assessment (Adult)       Row Name 03/08/25 0957          Triage Assessment    Airway WDL WDL        Respiratory WDL    Respiratory WDL X;cough     Cough Frequency frequent     Cough Type productive        Skin Circulation/Temperature WDL    Skin Circulation/Temperature WDL WDL        Cardiac WDL    Cardiac WDL WDL        Peripheral/Neurovascular WDL    Peripheral Neurovascular WDL WDL        Cognitive/Neuro/Behavioral WDL    Cognitive/Neuro/Behavioral WDL WDL

## 2025-03-08 NOTE — ED PROVIDER NOTES
"  History     Chief Complaint   Patient presents with    Flu Symptoms     HPI  Kd Hernandez is a 80 year old male with past medical history significant for benign prostatic hypertrophy hyperlipidemia hypertension flaccid small SVT who presents emergency department complaining of cough and congestion with low-grade fevers and productive cough.  Patient states symptoms have been present for about a week and actually have improved a bit but now he is coughing up some green sputum and is more congested in the chest although he is feeling a little better has had low-grade fevers denies any headache or visual changes has not any neck pain denies any abdominal pain or back pain has not any focal numbness weakness any extremity denies any bowel or bladder dysfunction.  Patient has not had a rash.  He has not had any chest pain with this and has not been significantly short of breath.    Allergies:  Allergies   Allergen Reactions    Sudafed [Pseudoephedrine Hcl]      jittery,insomnia    Sudafed [Pseudoephedrine Hcl]      Made \"jittery\".  insomnia       Problem List:    Patient Active Problem List    Diagnosis Date Noted    Paroxysmal supraventricular tachycardia 01/09/2023     Priority: Medium    Esophagitis 12/12/2017     Priority: Medium    Disorder of bilirubin excretion 12/12/2017     Priority: Medium    Merkel cell carcinoma of face (H) 12/12/2017     Priority: Medium    BPH without urinary obstruction 04/25/2016     Priority: Medium    Hyperlipidemia associated with type 2 diabetes mellitus (H) 10/20/2014     Priority: Medium    Superficial thrombophlebitis of right leg 08/19/2014     Priority: Medium    Hypertension, goal below 140/90 05/15/2013     Priority: Medium        Past Medical History:    No past medical history on file.    Past Surgical History:    Past Surgical History:   Procedure Laterality Date     TRANSURETHRAL ELEC-SURG PROSTATECTOM N/A 8/20/2019    Procedure: BIPOLAR TRANSURETHRAL RESECTION OF " "PROSTATE;  Surgeon: Sanchez Ferro MD;  Location: [facility] [place];  Service: Urology    HERNIA REPAIR      HERNIORRHAPHY UMBILICAL  12/6/2011    Procedure:HERNIORRHAPHY UMBILICAL; Umbilical Hernia Repair with Mesh; Surgeon:JESUS ALBERTO RODRÍGUEZ; Location:[place] OR    TONSILLECTOMY         Family History:    No family history on file.    Social History:  Marital Status:   [2]  Social History     Tobacco Use    Smoking status: Never    Smokeless tobacco: Never   Substance Use Topics    Alcohol use: Yes     Comment: Alcoholic Drinks/day: Rarely    Drug use: Not Currently        Medications:    alum hydroxide-mag carbonate (GAVISCON)  MG/15ML SUSP  aspirin 81 MG tablet  atenolol (TENORMIN) 25 MG tablet  lorazepam (ATIVAN) 0.5 MG tablet  MetFORmin (GLUCOPHAGE) 500 MG tablet  simvastatin (ZOCOR) 10 MG tablet  tamsulosin (FLOMAX) 0.4 MG 24 hr capsule          Review of Systems  As per HPI  Physical Exam   BP: (!) 156/89  Pulse: 67  Temp: 98  F (36.7  C)  Resp: 20  Height: 177.8 cm (5' 10\")  Weight: 82.6 kg (182 lb)  SpO2: 99 %      Physical Exam  Vitals and nursing note reviewed.   Constitutional:       General: He is not in acute distress.     Appearance: Normal appearance. He is not ill-appearing, toxic-appearing or diaphoretic.   HENT:      Head: Normocephalic and atraumatic.      Nose:      Comments: Mild nasal congestion no sinus tenderness present.     Mouth/Throat:      Mouth: Mucous membranes are moist.      Pharynx: Oropharynx is clear. No oropharyngeal exudate or posterior oropharyngeal erythema.   Eyes:      Conjunctiva/sclera: Conjunctivae normal.   Cardiovascular:      Rate and Rhythm: Normal rate and regular rhythm.      Pulses: Normal pulses.      Heart sounds: Normal heart sounds. No murmur heard.  Pulmonary:      Comments: No rhonchi wheezes or rales are heard breath sounds slightly decreased at bases.  Abdominal:      General: Abdomen is flat. There is no distension.      Palpations: Abdomen " is soft.      Tenderness: There is no abdominal tenderness. There is no right CVA tenderness or left CVA tenderness.   Musculoskeletal:         General: Normal range of motion.      Cervical back: Normal range of motion and neck supple.      Right lower leg: No edema.      Left lower leg: No edema.   Skin:     General: Skin is warm and dry.      Findings: No rash.   Neurological:      General: No focal deficit present.      Mental Status: He is alert and oriented to person, place, and time.      Motor: No weakness.      Coordination: Coordination normal.   Psychiatric:         Mood and Affect: Mood normal.         ED Course        Procedures              Critical Care time:  none     None         Results for orders placed or performed during the hospital encounter of 03/08/25 (from the past 24 hours)   Influenza A/B, RSV and SARS-CoV2 PCR (COVID-19) Nasopharyngeal    Specimen: Nasopharyngeal; Swab   Result Value Ref Range    Influenza A PCR Negative Negative    Influenza B PCR Negative Negative    RSV PCR Negative Negative    SARS CoV2 PCR Positive (A) Negative    Narrative    Testing was performed using the Xpert Xpress CoV2/Flu/RSV Assay on the [employer] GeneXpert Instrument. This test should be ordered for the detection of SARS-CoV2, influenza, and RSV viruses in individuals with signs and symptoms of respiratory tract infection. This test is for in vitro diagnostic use under the [place] FDA for laboratories certified under CLIA to perform high or moderate complexity testing. This test has been [place] FDA cleared. A negative result does not rule out the presence of PCR inhibitors in the specimen or target RNA in concentration below the limit of detection for the assay. If only one viral target is positive but coinfection with multiple targets is suspected, the sample should be re-tested with another FDA cleared, approved, or authorized test, if coninfection would change clinical management. This test was validated by the [facility]. These laboratories are certified under the Clinical Laboratory Improvement Amendments of 1988 (CLIA-88) as qualified to perfom high complexity laboratory testing.   Chest XR,  PA & LAT    Narrative    EXAM: XR CHEST 2 VIEWS  LOCATION: [facility]  DATE: 3/8/2025    INDICATION: cough and congestion  COMPARISON: 8/10/2014      Impression    IMPRESSION: Lungs are clear. No pleural effusion. Heart size and pulmonary vascularity within normal limits.       Medications - No data to display    Assessments & Plan (with Medical Decision Making) records were reviewed including past medical history medications and allergies.  Office visit on 1225 was reviewed.  ED visit on 11/3/2024 for SVT was reviewed.  COVID influenza was obtained along with chest x-ray.  COVID was positive.  Chest x-ray reveals no obvious infiltrate or other abnormality.  Since symptoms have been going on for so long outpatient oral treatment is not needed.  Patient oxygen saturations are good.  Take decongestants and cough medication as needed.  I am going to give him a Tessalon Perles.  I will also send him home with a pulse oximeter to monitor his oxygenation.  If increased shortness of breath or other symptoms present patient should return for recheck.  Patient feels comfortable with this plan at this time.     I have reviewed the nursing notes.    I have reviewed the findings, diagnosis, plan and need for follow up with the patient.             Discharge Medication List as of 3/8/2025 12:12 PM        START taking these medications    Details   benzonatate (TESSALON) 100 MG capsule Take 1 capsule (100 mg) by mouth 3 times daily as needed for cough., Disp-15 capsule, R-0, E-Prescribe             Final diagnoses:   COVID-19 virus infection       3/8/2025   [facility] EMERGENCY DEPT       Sanchez Mae MD  03/09/25 1855

## 2025-07-08 ENCOUNTER — LAB REQUISITION (OUTPATIENT)
Dept: LAB | Facility: CLINIC | Age: 80
End: 2025-07-08

## 2025-07-09 LAB
LAB ORDER RESULT STATUS: NORMAL
Lab: NORMAL
PERFORMING LABORATORY: NORMAL
TEST NAME: NORMAL

## 2025-07-16 LAB
SCANNED LAB RESULT: NORMAL
TEST NAME: NORMAL